# Patient Record
Sex: FEMALE | Race: BLACK OR AFRICAN AMERICAN | NOT HISPANIC OR LATINO | Employment: FULL TIME | URBAN - METROPOLITAN AREA
[De-identification: names, ages, dates, MRNs, and addresses within clinical notes are randomized per-mention and may not be internally consistent; named-entity substitution may affect disease eponyms.]

---

## 2018-09-06 ENCOUNTER — HOSPITAL ENCOUNTER (INPATIENT)
Facility: HOSPITAL | Age: 33
LOS: 2 days | Discharge: HOME | End: 2018-09-08
Attending: OBSTETRICS & GYNECOLOGY | Admitting: OBSTETRICS & GYNECOLOGY
Payer: MEDICAID

## 2018-09-06 DIAGNOSIS — O13.9 PIH (PREGNANCY INDUCED HYPERTENSION): ICD-10-CM

## 2018-09-06 DIAGNOSIS — Z3A.37 37 WEEKS GESTATION OF PREGNANCY: Primary | ICD-10-CM

## 2018-09-06 LAB
ALBUMIN SERPL BCP-MCNC: 2.7 G/DL
ALP SERPL-CCNC: 240 U/L
ALT SERPL W/O P-5'-P-CCNC: 24 U/L
AMPHET+METHAMPHET UR QL: NEGATIVE
ANION GAP SERPL CALC-SCNC: 5 MMOL/L
AST SERPL-CCNC: 32 U/L
BARBITURATES UR QL SCN>200 NG/ML: NEGATIVE
BENZODIAZ UR QL SCN>200 NG/ML: NEGATIVE
BILIRUB SERPL-MCNC: 0.2 MG/DL
BILIRUB UR QL STRIP: NEGATIVE
BUN SERPL-MCNC: 7 MG/DL
BZE UR QL SCN: NEGATIVE
CALCIUM SERPL-MCNC: 8.8 MG/DL
CANNABINOIDS UR QL SCN: NEGATIVE
CHLORIDE SERPL-SCNC: 109 MMOL/L
CLARITY UR: CLEAR
CO2 SERPL-SCNC: 21 MMOL/L
COLOR UR: YELLOW
CREAT SERPL-MCNC: 0.6 MG/DL
CREAT UR-MCNC: 150.7 MG/DL
CREAT UR-MCNC: 150.7 MG/DL
EST. GFR  (AFRICAN AMERICAN): >60 ML/MIN/1.73 M^2
EST. GFR  (NON AFRICAN AMERICAN): >60 ML/MIN/1.73 M^2
GLUCOSE SERPL-MCNC: 90 MG/DL
GLUCOSE UR QL STRIP: NEGATIVE
HGB UR QL STRIP: NEGATIVE
KETONES UR QL STRIP: NEGATIVE
LEUKOCYTE ESTERASE UR QL STRIP: NEGATIVE
NITRITE UR QL STRIP: NEGATIVE
OPIATES UR QL SCN: NEGATIVE
PCP UR QL SCN>25 NG/ML: NEGATIVE
PH UR STRIP: 7 [PH] (ref 5–8)
POTASSIUM SERPL-SCNC: 3.9 MMOL/L
PROT SERPL-MCNC: 6.2 G/DL
PROT UR QL STRIP: ABNORMAL
PROT UR-MCNC: 30 MG/DL
PROT/CREAT UR: 0.2 MG/G{CREAT}
SODIUM SERPL-SCNC: 135 MMOL/L
SP GR UR STRIP: 1.02 (ref 1–1.03)
TOXICOLOGY INFORMATION: NORMAL
URATE SERPL-MCNC: 4.5 MG/DL
URN SPEC COLLECT METH UR: ABNORMAL
UROBILINOGEN UR STRIP-ACNC: 1 EU/DL

## 2018-09-06 PROCEDURE — 3E0P7VZ INTRODUCTION OF HORMONE INTO FEMALE REPRODUCTIVE, VIA NATURAL OR ARTIFICIAL OPENING: ICD-10-PCS | Performed by: OBSTETRICS & GYNECOLOGY

## 2018-09-06 PROCEDURE — 84550 ASSAY OF BLOOD/URIC ACID: CPT

## 2018-09-06 PROCEDURE — 36415 COLL VENOUS BLD VENIPUNCTURE: CPT

## 2018-09-06 PROCEDURE — 10907ZC DRAINAGE OF AMNIOTIC FLUID, THERAPEUTIC FROM PRODUCTS OF CONCEPTION, VIA NATURAL OR ARTIFICIAL OPENING: ICD-10-PCS | Performed by: OBSTETRICS & GYNECOLOGY

## 2018-09-06 PROCEDURE — 3E033VJ INTRODUCTION OF OTHER HORMONE INTO PERIPHERAL VEIN, PERCUTANEOUS APPROACH: ICD-10-PCS | Performed by: OBSTETRICS & GYNECOLOGY

## 2018-09-06 PROCEDURE — 81003 URINALYSIS AUTO W/O SCOPE: CPT

## 2018-09-06 PROCEDURE — 80053 COMPREHEN METABOLIC PANEL: CPT

## 2018-09-06 PROCEDURE — 84156 ASSAY OF PROTEIN URINE: CPT

## 2018-09-06 PROCEDURE — 25000003 PHARM REV CODE 250: Performed by: OBSTETRICS & GYNECOLOGY

## 2018-09-06 PROCEDURE — 11000001 HC ACUTE MED/SURG PRIVATE ROOM

## 2018-09-06 PROCEDURE — 80307 DRUG TEST PRSMV CHEM ANLYZR: CPT

## 2018-09-06 RX ORDER — BUTORPHANOL TARTRATE 2 MG/ML
2 INJECTION INTRAMUSCULAR; INTRAVENOUS EVERY 4 HOURS PRN
Status: DISCONTINUED | OUTPATIENT
Start: 2018-09-06 | End: 2018-09-08 | Stop reason: HOSPADM

## 2018-09-06 RX ORDER — SODIUM CHLORIDE, SODIUM LACTATE, POTASSIUM CHLORIDE, CALCIUM CHLORIDE 600; 310; 30; 20 MG/100ML; MG/100ML; MG/100ML; MG/100ML
INJECTION, SOLUTION INTRAVENOUS CONTINUOUS
Status: DISCONTINUED | OUTPATIENT
Start: 2018-09-06 | End: 2018-09-08 | Stop reason: HOSPADM

## 2018-09-06 RX ORDER — OXYTOCIN/RINGER'S LACTATE 20/1000 ML
2 PLASTIC BAG, INJECTION (ML) INTRAVENOUS CONTINUOUS
Status: DISCONTINUED | OUTPATIENT
Start: 2018-09-07 | End: 2018-09-07

## 2018-09-06 RX ORDER — OXYTOCIN/RINGER'S LACTATE 20/1000 ML
333 PLASTIC BAG, INJECTION (ML) INTRAVENOUS CONTINUOUS
Status: ACTIVE | OUTPATIENT
Start: 2018-09-06 | End: 2018-09-06

## 2018-09-06 RX ADMIN — MISOPROSTOL 50 MCG: 100 TABLET ORAL at 10:09

## 2018-09-06 NOTE — HPI
32-year-old  with an IUP at 376 7th weeks presents for clinic today and was noted to have a blood pressure of 154/100.  Her repeat blood pressure was taken and was noted to be 155/107.  The patient denies any headaches.  She reports no abdominal pain or visual disturbances.  Her DTRs are 2/2.  Her dates are consistent with a 9 week ultrasound with a due date of 2018.  Blood pressures on labor and delivery remained elevated despite bed rest.  She was noted to have trace protein on her urinalysis.  PIH labs were in normal with a creatinine of 0.6.  Prenatal course is significant for a history of Chlamydia with a negative test of cure history of herpes on prophylaxis and no lesions visualized.    She is GBS negative.  A repeat HIV and RPR in the 3rd trimester were negative.

## 2018-09-06 NOTE — H&P
[facility] - Labor and Delivery  Obstetrics  History & Physical    Patient Name: Linda Hart  MRN: [id number]  Admission Date: 2018  Primary Care Provider: Primary Doctor No    Subjective:     Principal Problem:PIH (pregnancy induced hypertension)    History of Present Illness:  32-year-old  with an IUP at 376 7th weeks presents for clinic today and was noted to have a blood pressure of 154/100.  Her repeat blood pressure was taken and was noted to be 155/107.  The patient denies any headaches.  She reports no abdominal pain or visual disturbances.  Her DTRs are 2/2.  Her dates are consistent with a 9 week ultrasound with a due date of 2018.  Blood pressures on labor and delivery remained elevated despite bed rest.  She was noted to have trace protein on her urinalysis.  PIH labs were in normal with a creatinine of 0.6.  Prenatal course is significant for a history of Chlamydia with a negative test of cure history of herpes on prophylaxis and no lesions visualized.    She is GBS negative.  A repeat HIV and RPR in the 3rd trimester were negative.    Obstetric HPI:  Patient reports   Irregular contractions, active fetal movement, No vaginal bleeding , No loss of fluid     This pregnancy has been complicated by  Chlamydia with negative test of cure, herpes on prophylaxis with no lesions and today gestational hypertension    Obstetric History       T1      L1     SAB1   TAB0   Ectopic0   Multiple0   Live Births1       # Outcome Date GA Lbr Maycol/2nd Weight Sex Delivery Anes PTL Lv   3 Current            2 SAB            1 Term                 Past Medical History:   Diagnosis Date    Anemia     Herpes simplex virus (HSV) infection     Hypertension      Past Surgical History:   Procedure Laterality Date    DILATION AND CURETTAGE OF UTERUS         PTA Medications   Medication Sig    prenat.vits,alexa,min-iron-folic Tab Take by mouth once daily.       Review of patient's  allergies indicates:  No Known Allergies     Family History     Problem Relation (Age of Onset)    Hyperlipidemia Sister        Tobacco Use    Smoking status: Never Smoker   Substance and Sexual Activity    Alcohol use: No     Frequency: Never    Drug use: No    Sexual activity: Yes     Partners: Male     Birth control/protection: None     Review of Systems   Constitutional: Negative.    HENT: Negative.    Eyes: Negative.  Negative for visual disturbance.   Respiratory: Negative.    Cardiovascular: Negative.    Gastrointestinal: Negative.  Negative for abdominal pain.   Endocrine: Negative.    Genitourinary: Negative.    Musculoskeletal: Negative.    Skin:  Negative.   Neurological: Negative for headaches.   Hematological: Negative.    Psychiatric/Behavioral: Negative.  Negative for sleep disturbance.   Breast: negative.       Objective:     Vital Signs (Most Recent):    Vital Signs (24h Range):           There is no height or weight on file to calculate BMI.    FHT: 130sCat 1 (reassuring)  TOCO:  Q 6 minutes    Physical Exam:   Constitutional: She is oriented to person, place, and time. She appears well-developed and well-nourished.    HENT:   Head: Normocephalic and atraumatic.    Eyes: Conjunctivae and EOM are normal. Pupils are equal, round, and reactive to light.    Neck: Normal range of motion. Neck supple.    Cardiovascular: Normal rate, regular rhythm, normal heart sounds and intact distal pulses.     Pulmonary/Chest: Effort normal and breath sounds normal.        Abdominal: Soft. Bowel sounds are normal.             Musculoskeletal: Normal range of motion and moves all extremeties.       Neurological: She is alert and oriented to person, place, and time. She has normal reflexes.    Skin: Skin is warm and dry.    Psychiatric: She has a normal mood and affect. Her behavior is normal. Judgment and thought content normal.       Cervix:  Dilation:  2  Effacement:  50%  Station: -2  Presentation: Vertex      Significant Labs:  No results found for: GROUPTRH, HEPBSAG, RUBELLAIGGSC, STREPBCULT, AFP, SYDQPUU0SK    BMP:   Recent Labs   Lab  18   1459   GLU  90   NA  135*   K  3.9   CL  109   CO2  21*   BUN  7   CREATININE  0.6   CALCIUM  8.8     CBC: No results for input(s): WBC, RBC, HGB, HCT, PLT, MCV, MCH, MCHC in the last 48 hours.  Recent Labs   Lab  18   1405   COLORU  Yellow   SPECGRAV  1.020   PHUR  7.0   PROTEINUA  Trace*   NITRITE  Negative   LEUKOCYTESUR  Negative   UROBILINOGEN  1.0     I have personallly reviewed all pertinent lab results from the last 24 hours.    Assessment/Plan:     32 y.o. female  at 37w6d for:    * PIH (pregnancy induced hypertension)     Will proceed with Cytotec Pitocin induction            Francisco Chery MD  Obstetrics  [facility] - Labor and Delivery

## 2018-09-06 NOTE — SUBJECTIVE & OBJECTIVE
Obstetric HPI:  Patient reports   Irregular contractions, active fetal movement, No vaginal bleeding , No loss of fluid     This pregnancy has been complicated by  Chlamydia with negative test of cure, herpes on prophylaxis with no lesions and today gestational hypertension    Obstetric History       T1      L1     SAB1   TAB0   Ectopic0   Multiple0   Live Births1       # Outcome Date GA Lbr Maycol/2nd Weight Sex Delivery Anes PTL Lv   3 Current            2 SAB            1 Term                 Past Medical History:   Diagnosis Date    Anemia     Herpes simplex virus (HSV) infection     Hypertension      Past Surgical History:   Procedure Laterality Date    DILATION AND CURETTAGE OF UTERUS         PTA Medications   Medication Sig    prenat.vits,alexa,min-iron-folic Tab Take by mouth once daily.       Review of patient's allergies indicates:  No Known Allergies     Family History     Problem Relation (Age of Onset)    Hyperlipidemia Sister        Tobacco Use    Smoking status: Never Smoker   Substance and Sexual Activity    Alcohol use: No     Frequency: Never    Drug use: No    Sexual activity: Yes     Partners: Male     Birth control/protection: None     Review of Systems   Constitutional: Negative.    HENT: Negative.    Eyes: Negative.  Negative for visual disturbance.   Respiratory: Negative.    Cardiovascular: Negative.    Gastrointestinal: Negative.  Negative for abdominal pain.   Endocrine: Negative.    Genitourinary: Negative.    Musculoskeletal: Negative.    Skin:  Negative.   Neurological: Negative for headaches.   Hematological: Negative.    Psychiatric/Behavioral: Negative.  Negative for sleep disturbance.   Breast: negative.       Objective:     Vital Signs (Most Recent):    Vital Signs (24h Range):           There is no height or weight on file to calculate BMI.    FHT: 130sCat 1 (reassuring)  TOCO:  Q 6 minutes    Physical Exam:   Constitutional: She is oriented to person, place, and  time. She appears well-developed and well-nourished.    HENT:   Head: Normocephalic and atraumatic.    Eyes: Conjunctivae and EOM are normal. Pupils are equal, round, and reactive to light.    Neck: Normal range of motion. Neck supple.    Cardiovascular: Normal rate, regular rhythm, normal heart sounds and intact distal pulses.     Pulmonary/Chest: Effort normal and breath sounds normal.        Abdominal: Soft. Bowel sounds are normal.             Musculoskeletal: Normal range of motion and moves all extremeties.       Neurological: She is alert and oriented to person, place, and time. She has normal reflexes.    Skin: Skin is warm and dry.    Psychiatric: She has a normal mood and affect. Her behavior is normal. Judgment and thought content normal.       Cervix:  Dilation:  2  Effacement:  50%  Station: -2  Presentation: Vertex     Significant Labs:  No results found for: GROUPTRH, HEPBSAG, RUBELLAIGGSC, STREPBCULT, AFP, RUIRGUI0PB    BMP:   Recent Labs   Lab  09/06/18   1459   GLU  90   NA  135*   K  3.9   CL  109   CO2  21*   BUN  7   CREATININE  0.6   CALCIUM  8.8     CBC: No results for input(s): WBC, RBC, HGB, HCT, PLT, MCV, MCH, MCHC in the last 48 hours.  Recent Labs   Lab  09/06/18   1405   COLORU  Yellow   SPECGRAV  1.020   PHUR  7.0   PROTEINUA  Trace*   NITRITE  Negative   LEUKOCYTESUR  Negative   UROBILINOGEN  1.0     I have personallly reviewed all pertinent lab results from the last 24 hours.

## 2018-09-07 LAB
ABO + RH BLD: NORMAL
BASOPHILS # BLD AUTO: 0.03 K/UL
BASOPHILS NFR BLD: 0.2 %
BLD GP AB SCN CELLS X3 SERPL QL: NORMAL
DIFFERENTIAL METHOD: ABNORMAL
EOSINOPHIL # BLD AUTO: 0 K/UL
EOSINOPHIL NFR BLD: 0.3 %
ERYTHROCYTE [DISTWIDTH] IN BLOOD BY AUTOMATED COUNT: 14.6 %
HCT VFR BLD AUTO: 34.6 %
HGB BLD-MCNC: 11.3 G/DL
IMM GRANULOCYTES # BLD AUTO: 0.12 K/UL
IMM GRANULOCYTES NFR BLD AUTO: 0.9 %
LYMPHOCYTES # BLD AUTO: 1.5 K/UL
LYMPHOCYTES NFR BLD: 11.1 %
MCH RBC QN AUTO: 28 PG
MCHC RBC AUTO-ENTMCNC: 32.7 G/DL
MCV RBC AUTO: 86 FL
MONOCYTES # BLD AUTO: 1 K/UL
MONOCYTES NFR BLD: 7.7 %
NEUTROPHILS # BLD AUTO: 10.5 K/UL
NEUTROPHILS NFR BLD: 79.8 %
NRBC BLD-RTO: 0 /100 WBC
PLATELET # BLD AUTO: 169 K/UL
PMV BLD AUTO: 12.6 FL
RBC # BLD AUTO: 4.03 M/UL
WBC # BLD AUTO: 13.2 K/UL

## 2018-09-07 PROCEDURE — 85025 COMPLETE CBC W/AUTO DIFF WBC: CPT

## 2018-09-07 PROCEDURE — 25000003 PHARM REV CODE 250: Performed by: OBSTETRICS & GYNECOLOGY

## 2018-09-07 PROCEDURE — 86850 RBC ANTIBODY SCREEN: CPT

## 2018-09-07 PROCEDURE — 72100002 HC LABOR CARE, 1ST 8 HOURS

## 2018-09-07 PROCEDURE — 63600175 PHARM REV CODE 636 W HCPCS: Performed by: OBSTETRICS & GYNECOLOGY

## 2018-09-07 PROCEDURE — 11000001 HC ACUTE MED/SURG PRIVATE ROOM

## 2018-09-07 PROCEDURE — 36415 COLL VENOUS BLD VENIPUNCTURE: CPT

## 2018-09-07 PROCEDURE — 72200005 HC VAGINAL DELIVERY LEVEL II

## 2018-09-07 RX ORDER — ACETAMINOPHEN 325 MG/1
650 TABLET ORAL EVERY 6 HOURS PRN
Status: DISCONTINUED | OUTPATIENT
Start: 2018-09-07 | End: 2018-09-08 | Stop reason: HOSPADM

## 2018-09-07 RX ORDER — DOCUSATE SODIUM 100 MG/1
200 CAPSULE, LIQUID FILLED ORAL 2 TIMES DAILY PRN
Status: DISCONTINUED | OUTPATIENT
Start: 2018-09-07 | End: 2018-09-08 | Stop reason: HOSPADM

## 2018-09-07 RX ORDER — HYDROCODONE BITARTRATE AND ACETAMINOPHEN 7.5; 325 MG/1; MG/1
1 TABLET ORAL EVERY 4 HOURS PRN
Status: DISCONTINUED | OUTPATIENT
Start: 2018-09-07 | End: 2018-09-08 | Stop reason: HOSPADM

## 2018-09-07 RX ORDER — SODIUM CHLORIDE 0.9 % (FLUSH) 0.9 %
3 SYRINGE (ML) INJECTION EVERY 8 HOURS
Status: CANCELLED | OUTPATIENT
Start: 2018-09-07

## 2018-09-07 RX ORDER — ONDANSETRON 4 MG/1
8 TABLET, ORALLY DISINTEGRATING ORAL EVERY 8 HOURS PRN
Status: DISCONTINUED | OUTPATIENT
Start: 2018-09-07 | End: 2018-09-08 | Stop reason: HOSPADM

## 2018-09-07 RX ORDER — HYDROCODONE BITARTRATE AND ACETAMINOPHEN 10; 325 MG/1; MG/1
1 TABLET ORAL EVERY 4 HOURS PRN
Status: DISCONTINUED | OUTPATIENT
Start: 2018-09-07 | End: 2018-09-08 | Stop reason: HOSPADM

## 2018-09-07 RX ORDER — DIPHENHYDRAMINE HYDROCHLORIDE 50 MG/ML
25 INJECTION INTRAMUSCULAR; INTRAVENOUS EVERY 4 HOURS PRN
Status: DISCONTINUED | OUTPATIENT
Start: 2018-09-07 | End: 2018-09-08 | Stop reason: HOSPADM

## 2018-09-07 RX ORDER — HYDROCORTISONE 25 MG/G
CREAM TOPICAL 3 TIMES DAILY PRN
Status: DISCONTINUED | OUTPATIENT
Start: 2018-09-07 | End: 2018-09-08 | Stop reason: HOSPADM

## 2018-09-07 RX ORDER — OXYTOCIN/RINGER'S LACTATE 20/1000 ML
41.65 PLASTIC BAG, INJECTION (ML) INTRAVENOUS CONTINUOUS
Status: CANCELLED | OUTPATIENT
Start: 2018-09-07 | End: 2018-09-07

## 2018-09-07 RX ORDER — DIPHENHYDRAMINE HCL 25 MG
25 CAPSULE ORAL EVERY 4 HOURS PRN
Status: DISCONTINUED | OUTPATIENT
Start: 2018-09-07 | End: 2018-09-08 | Stop reason: HOSPADM

## 2018-09-07 RX ADMIN — SODIUM CHLORIDE, POTASSIUM CHLORIDE, SODIUM LACTATE AND CALCIUM CHLORIDE: 600; 310; 30; 20 INJECTION, SOLUTION INTRAVENOUS at 02:09

## 2018-09-07 RX ADMIN — HYDROCODONE BITARTRATE AND ACETAMINOPHEN 1 TABLET: 7.5; 325 TABLET ORAL at 10:09

## 2018-09-07 RX ADMIN — Medication 2 MILLI-UNITS/MIN: at 02:09

## 2018-09-07 RX ADMIN — BUTORPHANOL TARTRATE 2 MG: 2 INJECTION, SOLUTION INTRAMUSCULAR; INTRAVENOUS at 12:09

## 2018-09-07 RX ADMIN — PROMETHAZINE HYDROCHLORIDE 25 MG: 25 INJECTION INTRAMUSCULAR; INTRAVENOUS at 12:09

## 2018-09-07 RX ADMIN — HYDROCODONE BITARTRATE AND ACETAMINOPHEN 1 TABLET: 7.5; 325 TABLET ORAL at 05:09

## 2018-09-07 NOTE — NURSING
0645:SBAR report received from DANA Chambers RN. Upon entering room pt lying in bed resting. Pt denies any needs or discomfort at this time. Bleeding noted to be moderate and fundus firm and midline. Will continue to monitor and assess.

## 2018-09-07 NOTE — HOSPITAL COURSE
32-year-old  with an IUP at 376 7th weeks presents for clinic today and was noted to have a blood pressure of 154/100.  Her repeat blood pressure was taken and was noted to be 155/107.  The patient denies any headaches.  She reports no abdominal pain or visual disturbances.  Her DTRs are 2/2.  Her dates are consistent with a 9 week ultrasound with a due date of 2018.  Blood pressures on labor and delivery remained elevated despite bed rest.  She was noted to have trace protein on her urinalysis.  PIH labs were in normal with a creatinine of 0.6.  Prenatal course is significant for a history of Chlamydia with a negative test of cure history of herpes on prophylaxis and no lesions visualized.    She is GBS negative.  A repeat HIV and RPR in the 3rd trimester were negative.\    L/d note bp 130/80, dtr 2/2.  cx 8cm , 80% on 20 mu pit.  fht stable no decels.  arom clear.  hct 34/  33  ua clear.  cytotec /pitocin /arom   dictation conf number [id number]  Female apgars 9/10 7#2oz

## 2018-09-07 NOTE — PROGRESS NOTES
[facility] - Post Partum  Obstetrics  Antepartum Progress Note    Patient Name: Linda Hart  MRN: [id number]  Admission Date: 2018  Hospital Length of Stay: 2 days  Attending Physician: Francois Frias MD  Primary Care Provider: Primary Doctor No    Subjective:     Principal Problem:PIH (pregnancy induced hypertension)    HPI:  32-year-old  with an IUP at 376 7th weeks presents for clinic today and was noted to have a blood pressure of 154/100.  Her repeat blood pressure was taken and was noted to be 155/107.  The patient denies any headaches.  She reports no abdominal pain or visual disturbances.  Her DTRs are 2/2.  Her dates are consistent with a 9 week ultrasound with a due date of 2018.  Blood pressures on labor and delivery remained elevated despite bed rest.  She was noted to have trace protein on her urinalysis.  PIH labs were in normal with a creatinine of 0.6.  Prenatal course is significant for a history of Chlamydia with a negative test of cure history of herpes on prophylaxis and no lesions visualized.    She is GBS negative.  A repeat HIV and RPR in the 3rd trimester were negative.    Hospital Course:  32-year-old  with an IUP at 376 7th weeks presents for clinic today and was noted to have a blood pressure of 154/100.  Her repeat blood pressure was taken and was noted to be 155/107.  The patient denies any headaches.  She reports no abdominal pain or visual disturbances.  Her DTRs are 2/2.  Her dates are consistent with a 9 week ultrasound with a due date of 2018.  Blood pressures on labor and delivery remained elevated despite bed rest.  She was noted to have trace protein on her urinalysis.  PIH labs were in normal with a creatinine of 0.6.  Prenatal course is significant for a history of Chlamydia with a negative test of cure history of herpes on prophylaxis and no lesions visualized.    She is GBS negative.  A repeat HIV and RPR in the 3rd trimester were  "negative.\    L/d note bp 130/80, dtr 2/2.  cx 8cm , 80% on 20 mu pit.  fht stable no decels.  arom clear.  hct 34/  33  ua clear.  cytotec /pitocin /arom   dictation conf number [id number]  Female apgars 9/10 7#2oz      Assessment/Plan:     32 y.o. female  at 38w1d for:dc today    * PIH (pregnancy induced hypertension)     Will proceed with Cytotec Pitocin induction              Francois Frias MD  Obstetrics  [facility] - Post Partum          PPD#1 S/P     Subjective: No complaints    Objective: BP (!) 155/88 (BP Location: Left arm, Patient Position: Sitting)   Pulse 91   Temp 98 °F (36.7 °C) (Oral)   Resp 16   Ht 5' 6.5" (1.689 m)   Wt 62.6 kg (138 lb)   SpO2 97%   Breastfeeding? Yes   BMI 21.94 kg/m²     H/H:   Lab Results   Component Value Date    WBC 13.79 (H) 2018    HGB 10.9 (L) 2018    HCT 33.0 (L) 2018    MCV 85 2018     2018       Fundus: Firm, non- tender  Lochia: Moderate  Extremities: Non-tender, no edema    Assessment: PPD #1 S/P     Plan: Routine progressive care    "

## 2018-09-07 NOTE — L&D DELIVERY NOTE
DATE OF PROCEDURE:  2018.    DIAGNOSIS:  Uterine pregnancy at 37 and half weeks, delivered viable female  infant, Apgars 9 and 10.  Cytotec, Pitocin induction, artificial rupture of  membranes, spontaneous vaginal delivery and gestational hypertension.    PERTINENT HISTORY:  The patient is a 32-year-old  2, para 1 at 37  and 6/7 weeks who presented to clinic with a blood pressure of 154/100,  repeat blood pressure was 155/107.  She denied headaches, visual  disturbances or abdominal pain.  DTRs were 2/2.  She was noted to have  trace protein on urinalysis, PIH, labs were within normal limits, GBS  negative.  HIV, RPR negative, hepatitis B negative.  She was given Cytotec  followed by Pitocin.  Maximum Pitocin was 20 milliunits.  Blood pressures  were in the 140-160 range over  with a 2/2 DTRs and she had  artificial rupture of membranes at 8 cm, which revealed clear fluid.   Maximum Pitocin was 20 milliunits.  Fetal heart tones were stable.   Artificial rupture of membranes revealed clear fluid.  She dilated to  complete, pushed effectively, delivered under intact perineum, direct OA,  suctioned thoroughly on the perineum.  Shoulders delivered without  difficulty.  Cord was clamped and cut and the infant placed on the maternal  abdomen with nurses in attendance.  Blood obtained from umbilical cord for  Rh status and CBC.  Placenta delivered, Castro intact.  TRACY cord.  No  lacerations were noted.  The patient tolerated the labor and delivery well.        TRINI dd: 2018 06:10:36 (CDT)   td: 2018 07:43:51 (CDT)  Doc ID #[id number]   Job ID #[id number]    CC:

## 2018-09-08 VITALS
RESPIRATION RATE: 18 BRPM | HEIGHT: 67 IN | OXYGEN SATURATION: 96 % | SYSTOLIC BLOOD PRESSURE: 128 MMHG | TEMPERATURE: 98 F | WEIGHT: 138 LBS | BODY MASS INDEX: 21.66 KG/M2 | HEART RATE: 81 BPM | DIASTOLIC BLOOD PRESSURE: 88 MMHG

## 2018-09-08 LAB
BASOPHILS # BLD AUTO: 0.05 K/UL
BASOPHILS NFR BLD: 0.4 %
DIFFERENTIAL METHOD: ABNORMAL
EOSINOPHIL # BLD AUTO: 0.1 K/UL
EOSINOPHIL NFR BLD: 0.6 %
ERYTHROCYTE [DISTWIDTH] IN BLOOD BY AUTOMATED COUNT: 14.4 %
HCT VFR BLD AUTO: 33 %
HGB BLD-MCNC: 10.9 G/DL
IMM GRANULOCYTES # BLD AUTO: 0.12 K/UL
IMM GRANULOCYTES NFR BLD AUTO: 0.9 %
LYMPHOCYTES # BLD AUTO: 2.1 K/UL
LYMPHOCYTES NFR BLD: 14.9 %
MCH RBC QN AUTO: 28.2 PG
MCHC RBC AUTO-ENTMCNC: 33 G/DL
MCV RBC AUTO: 85 FL
MONOCYTES # BLD AUTO: 1.1 K/UL
MONOCYTES NFR BLD: 7.8 %
NEUTROPHILS # BLD AUTO: 10.4 K/UL
NEUTROPHILS NFR BLD: 75.4 %
NRBC BLD-RTO: 0 /100 WBC
PLATELET # BLD AUTO: 181 K/UL
PMV BLD AUTO: 12.8 FL
RBC # BLD AUTO: 3.87 M/UL
WBC # BLD AUTO: 13.79 K/UL

## 2018-09-08 PROCEDURE — 63600175 PHARM REV CODE 636 W HCPCS: Performed by: OBSTETRICS & GYNECOLOGY

## 2018-09-08 PROCEDURE — 90471 IMMUNIZATION ADMIN: CPT | Performed by: OBSTETRICS & GYNECOLOGY

## 2018-09-08 PROCEDURE — 85025 COMPLETE CBC W/AUTO DIFF WBC: CPT

## 2018-09-08 PROCEDURE — 36415 COLL VENOUS BLD VENIPUNCTURE: CPT

## 2018-09-08 PROCEDURE — 90715 TDAP VACCINE 7 YRS/> IM: CPT | Performed by: OBSTETRICS & GYNECOLOGY

## 2018-09-08 RX ORDER — HYDROCODONE BITARTRATE AND ACETAMINOPHEN 7.5; 325 MG/1; MG/1
1 TABLET ORAL EVERY 4 HOURS PRN
Qty: 20 TABLET | Refills: 0 | Status: SHIPPED | OUTPATIENT
Start: 2018-09-08 | End: 2018-11-15

## 2018-09-08 RX ORDER — DOCUSATE SODIUM 100 MG/1
200 CAPSULE, LIQUID FILLED ORAL 2 TIMES DAILY PRN
Refills: 0 | COMMUNITY
Start: 2018-09-08 | End: 2018-11-15

## 2018-09-08 RX ADMIN — CLOSTRIDIUM TETANI TOXOID ANTIGEN (FORMALDEHYDE INACTIVATED), CORYNEBACTERIUM DIPHTHERIAE TOXOID ANTIGEN (FORMALDEHYDE INACTIVATED), BORDETELLA PERTUSSIS TOXOID ANTIGEN (GLUTARALDEHYDE INACTIVATED), BORDETELLA PERTUSSIS FILAMENTOUS HEMAGGLUTININ ANTIGEN (FORMALDEHYDE INACTIVATED), BORDETELLA PERTUSSIS PERTACTIN ANTIGEN, AND BORDETELLA PERTUSSIS FIMBRIAE 2/3 ANTIGEN 0.5 ML: 5; 2; 2.5; 5; 3; 5 INJECTION, SUSPENSION INTRAMUSCULAR at 12:09

## 2018-09-08 NOTE — NURSING
Pt presented with AVS and d/c  Instructions. Pt given prescriptions. Pt verbalizes understanding of all d/c instructions and states no further questions at this time. Pt aware to call and make follow-up appointment with Dr. Frias on Monday 9/10/18.

## 2018-09-08 NOTE — DISCHARGE SUMMARY
[facility] - Post Partum  Obstetrics  Discharge Summary      Patient Name: Linda Hart  MRN: [id number]  Admission Date: 2018  Hospital Length of Stay: 2 days  Discharge Date and Time:  2018 7:15 AM  Attending Physician: Francois Frias MD   Discharging Provider: Francois Frias MD  Primary Care Provider: Primary Doctor No    HPI:     * No surgery found *     Hospital Course:      32-year-old  with an IUP at 376 7th weeks presents for clinic today and was noted to have a blood pressure of 154/100.  Her repeat blood pressure was taken and was noted to be 155/107.  The patient denies any headaches.  She reports no abdominal pain or visual disturbances.  Her DTRs are 2/2.  Her dates are consistent with a 9 week ultrasound with a due date of 2018.  Blood pressures on labor and delivery remained elevated despite bed rest.  She was noted to have trace protein on her urinalysis.  PIH labs were in normal with a creatinine of 0.6.  Prenatal course is significant for a history of Chlamydia with a negative test of cure history of herpes on prophylaxis and no lesions visualized.    She is GBS negative.  A repeat HIV and RPR in the 3rd trimester were negative.\    L/d note bp 130/80, dtr 2/2.  cx 8cm , 80% on 20 mu pit.  fht stable no decels.  arom clear.  hct 34/  33  ua clear.  cytotec /pitocin /arom   dictation conf number [id number]  Female apgars 9/10 7#2oz    Final Active Diagnoses:    Diagnosis Date Noted POA    PRINCIPAL PROBLEM:  PIH (pregnancy induced hypertension) [O13.9] 2018 Yes    37 weeks gestation of pregnancy [Z3A.37] 2018 Not Applicable      Problems Resolved During this Admission:        Labs: All labs within the past 24 hours have been reviewed    Feeding Method: breast    Immunizations     Date Immunization Status Dose Route/Site Given by    18 1036 Tdap Incomplete 0.5 mL Intramuscular/Left deltoid           Delivery:    Episiotomy: None    Lacerations: None   Repair suture: None   Repair # of packets:     Blood loss (ml):       Birth information:  YOB: 2016   Time of birth: 5:43 AM   Sex: female   Delivery type: Vaginal, Spontaneous Delivery   Gestational Age: 38w0d    Delivery Clinician:      Other providers:       Additional  information:  Forceps:    Vacuum:    Breech:    Observed anomalies Admitting this is a 32-year-old  2 para 1 came with labor pains gestational age of 38 weeks time and with a spontaneous vaginal delivery and uneventful baby was latching onto the breast after that.     Living?:           APGARS  One minute Five minutes Ten minutes   Skin color:         Heart rate:         Grimace:         Muscle tone:         Breathing:         Totals: 9  10        Placenta: Delivered:       appearance    Pending Diagnostic Studies:     None          Discharged Condition: good    Disposition: Home or Self Care    Follow Up:  Follow-up Information     Francois Frias MD In 2 weeks.    Specialty:  Obstetrics and Gynecology  Contact information:  [address] [facility] [address]  [phone]                 Patient Instructions:      Diet Adult Regular     Lifting restrictions   Order Comments: Lift less than 20 lbs     Other restrictions (specify):   Order Comments: No sex x 6 wks     Notify your health care provider if you experience any of the following:  temperature >100.4     Notify your health care provider if you experience any of the following:  severe uncontrolled pain     Notify your health care provider if you experience any of the following:  redness, tenderness, or signs of infection (pain, swelling, redness, odor or green/yellow discharge around incision site)     Notify your health care provider if you experience any of the following:  difficulty breathing or increased cough     Notify your health care provider if you experience any of the following:  severe persistent headache      Notify your health care provider if you experience any of the following:   Order Comments: Increased pain     Change dressing (specify)   Order Comments: If aquasel dressing, do not remove dressing. And you may shower with it on.  It will be removed in the office at one week.     Medications:  Current Discharge Medication List      START taking these medications    Details   docusate sodium (COLACE) 100 MG capsule Take 2 capsules (200 mg total) by mouth 2 (two) times daily as needed for Constipation.  Refills: 0      HYDROcodone-acetaminophen (NORCO) 7.5-325 mg per tablet Take 1 tablet by mouth every 4 (four) hours as needed.  Qty: 20 tablet, Refills: 0         CONTINUE these medications which have NOT CHANGED    Details   prenat.vits,alexa,min-iron-folic Tab Take by mouth once daily.             Francois Frias MD  Obstetrics  [facility] - Post Partum

## 2018-09-08 NOTE — NURSING
Pt ambulated to car for d/c without difficulty. Pt has all personal belongings with her. Pt denies any further needs, pt d/c home as per MD order.

## 2018-09-08 NOTE — DISCHARGE SUMMARY
[facility]  Obstetrics  Discharge Summary      Patient Name: Linda Hart  MRN: [id number]  Admission Date: 2018  Hospital Length of Stay: 2 days  Discharge Date and Time:  2018 7:13 AM  Attending Physician: Francois Frias MD   Discharging Provider: Francois Frias MD  Primary Care Provider: Primary Doctor No    HPI:    * No surgery found *     Hospital Course: 32-year-old  with an IUP at 376 7th weeks presents for clinic today and was noted to have a blood pressure of 154/100.  Her repeat blood pressure was taken and was noted to be 155/107.  The patient denies any headaches.  She reports no abdominal pain or visual disturbances.  Her DTRs are 2/2.  Her dates are consistent with a 9 week ultrasound with a due date of 2018.  Blood pressures on labor and delivery remained elevated despite bed rest.  She was noted to have trace protein on her urinalysis.  PIH labs were in normal with a creatinine of 0.6.  Prenatal course is significant for a history of Chlamydia with a negative test of cure history of herpes on prophylaxis and no lesions visualized.    She is GBS negative.  A repeat HIV and RPR in the 3rd trimester were negative.\    L/d note bp 130/80, dtr 2/2.  cx 8cm , 80% on 20 mu pit.  fht stable no decels.  arom clear.  hct 34/  33  ua clear.  cytotec /pitocin /arom   dictation conf number [id number]  Female apgars 9/10 7#2oz        Final Active Diagnoses:    Diagnosis Date Noted POA    PRINCIPAL PROBLEM:  PIH (pregnancy induced hypertension) [O13.9] 2018 Yes    37 weeks gestation of pregnancy [Z3A.37] 2018 Not Applicable      Problems Resolved During this Admission:        Labs: All labs within the past 24 hours have been reviewed    Feeding Method: breast    Immunizations     Date Immunization Status Dose Route/Site Given by    18 1036 Tdap Incomplete 0.5 mL Intramuscular/Left deltoid           Delivery:    Episiotomy: None   Lacerations:  None   Repair suture: None   Repair # of packets:     Blood loss (ml):       Birth information:  YOB: 2016   Time of birth: 5:43 AM   Sex: female   Delivery type: Vaginal, Spontaneous Delivery   Gestational Age: 38w0d    Delivery Clinician:      Other providers:       Additional  information:  Forceps:    Vacuum:    Breech:    Observed anomalies Admitting this is a 32-year-old  2 para 1 came with labor pains gestational age of 38 weeks time and with a spontaneous vaginal delivery and uneventful baby was latching onto the breast after that.     Living?:           APGARS  One minute Five minutes Ten minutes   Skin color:         Heart rate:         Grimace:         Muscle tone:         Breathing:         Totals: 9  10        Placenta: Delivered:       appearance    Pending Diagnostic Studies:     None          Discharged Condition: good    Disposition: Home or Self Care    Follow Up:  Follow-up Information     Francois Frias MD In 2 weeks.    Specialty:  Obstetrics and Gynecology  Contact information:  [address] [facility] [address]  [phone]                 Patient Instructions:      Diet Adult Regular     Lifting restrictions   Order Comments: Lift less than 20 lbs     Other restrictions (specify):   Order Comments: No sex x 6 wks     Notify your health care provider if you experience any of the following:  temperature >100.4     Notify your health care provider if you experience any of the following:  severe uncontrolled pain     Notify your health care provider if you experience any of the following:  redness, tenderness, or signs of infection (pain, swelling, redness, odor or green/yellow discharge around incision site)     Notify your health care provider if you experience any of the following:  difficulty breathing or increased cough     Notify your health care provider if you experience any of the following:  severe persistent headache     Notify your  health care provider if you experience any of the following:   Order Comments: Increased pain     Change dressing (specify)   Order Comments: If aquasel dressing, do not remove dressing. And you may shower with it on.  It will be removed in the office at one week.     Medications:  Current Discharge Medication List      START taking these medications    Details   docusate sodium (COLACE) 100 MG capsule Take 2 capsules (200 mg total) by mouth 2 (two) times daily as needed for Constipation.  Refills: 0      HYDROcodone-acetaminophen (NORCO) 7.5-325 mg per tablet Take 1 tablet by mouth every 4 (four) hours as needed.  Qty: 20 tablet, Refills: 0         CONTINUE these medications which have NOT CHANGED    Details   prenat.vits,alexa,min-iron-folic Tab Take by mouth once daily.             Francois Frias MD  Obstetrics  [facility]

## 2018-09-08 NOTE — DISCHARGE SUMMARY
[facility] Partum  Obstetrics  Discharge Summary      Patient Name: Linda Hart  MRN: [id number]  Admission Date: 2018  Hospital Length of Stay: 2 days  Discharge Date and Time:  2018 7:16 AM  Attending Physician: Francois Frias MD   Discharging Provider: Francois Frias MD  Primary Care Provider: Primary Doctor No    HPI:     * No surgery found *     Hospital Course:  32-year-old  with an IUP at 376 7th weeks presents for clinic today and was noted to have a blood pressure of 154/100.  Her repeat blood pressure was taken and was noted to be 155/107.  The patient denies any headaches.  She reports no abdominal pain or visual disturbances.  Her DTRs are 2/2.  Her dates are consistent with a 9 week ultrasound with a due date of 2018.  Blood pressures on labor and delivery remained elevated despite bed rest.  She was noted to have trace protein on her urinalysis.  PIH labs were in normal with a creatinine of 0.6.  Prenatal course is significant for a history of Chlamydia with a negative test of cure history of herpes on prophylaxis and no lesions visualized.    She is GBS negative.  A repeat HIV and RPR in the 3rd trimester were negative.\    L/d note bp 130/80, dtr 2/2.  cx 8cm , 80% on 20 mu pit.  fht stable no decels.  arom clear.  hct 34/  33  ua clear.  cytotec /pitocin /arom   dictation conf number [id number]  Female apgars 9/10 7#2oz      Final Active Diagnoses:    Diagnosis Date Noted POA    PRINCIPAL PROBLEM:  PIH (pregnancy induced hypertension) [O13.9] 2018 Yes    37 weeks gestation of pregnancy [Z3A.37] 2018 Not Applicable      Problems Resolved During this Admission:        Labs: All labs within the past 24 hours have been reviewed    Feeding Method: breast    Immunizations     Date Immunization Status Dose Route/Site Given by    18 1036 Tdap Incomplete 0.5 mL Intramuscular/Left deltoid           Delivery:    Episiotomy: None   Lacerations:  None   Repair suture: None   Repair # of packets:     Blood loss (ml):       Birth information:  YOB: 2016   Time of birth: 5:43 AM   Sex: female   Delivery type: Vaginal, Spontaneous Delivery   Gestational Age: 38w0d    Delivery Clinician:      Other providers:       Additional  information:  Forceps:    Vacuum:    Breech:    Observed anomalies Admitting this is a 32-year-old  2 para 1 came with labor pains gestational age of 38 weeks time and with a spontaneous vaginal delivery and uneventful baby was latching onto the breast after that.     Living?:           APGARS  One minute Five minutes Ten minutes   Skin color:         Heart rate:         Grimace:         Muscle tone:         Breathing:         Totals: 9  10        Placenta: Delivered:       appearance    Pending Diagnostic Studies:     None          Discharged Condition: good    Disposition: Home or Self Care    Follow Up:  Follow-up Information     Francois Frias MD In 2 weeks.    Specialty:  Obstetrics and Gynecology  Contact information:  [address] [facility] [address]  [phone]                 Patient Instructions:      Diet Adult Regular     Lifting restrictions   Order Comments: Lift less than 20 lbs     Other restrictions (specify):   Order Comments: No sex x 6 wks     Notify your health care provider if you experience any of the following:  temperature >100.4     Notify your health care provider if you experience any of the following:  severe uncontrolled pain     Notify your health care provider if you experience any of the following:  redness, tenderness, or signs of infection (pain, swelling, redness, odor or green/yellow discharge around incision site)     Notify your health care provider if you experience any of the following:  difficulty breathing or increased cough     Notify your health care provider if you experience any of the following:  severe persistent headache     Notify your  health care provider if you experience any of the following:   Order Comments: Increased pain     Change dressing (specify)   Order Comments: If aquasel dressing, do not remove dressing. And you may shower with it on.  It will be removed in the office at one week.     Medications:  Current Discharge Medication List      START taking these medications    Details   docusate sodium (COLACE) 100 MG capsule Take 2 capsules (200 mg total) by mouth 2 (two) times daily as needed for Constipation.  Refills: 0      HYDROcodone-acetaminophen (NORCO) 7.5-325 mg per tablet Take 1 tablet by mouth every 4 (four) hours as needed.  Qty: 20 tablet, Refills: 0         CONTINUE these medications which have NOT CHANGED    Details   prenat.vits,alexa,min-iron-folic Tab Take by mouth once daily.             Francois Frias MD  Obstetrics  [facility]

## 2018-09-10 NOTE — PLAN OF CARE
09/10/18 1606   Final Note   Assessment Type Final Discharge Note   Discharge Disposition Home   What phone number can be called within the next 1-3 days to see how you are doing after discharge? [phone]   Hospital Follow Up  Appt(s) scheduled? Yes   Discharge plans and expectations educations in teach back method with documentation complete? Yes   Called patient with follow up appointment. Demonstrated understanding by verbal feedback. Denies any discharge needs.

## 2018-11-05 ENCOUNTER — TELEPHONE (OUTPATIENT)
Dept: SURGERY | Facility: CLINIC | Age: 33
End: 2018-11-05

## 2018-11-15 ENCOUNTER — OFFICE VISIT (OUTPATIENT)
Dept: SURGERY | Facility: CLINIC | Age: 33
End: 2018-11-15
Payer: MEDICAID

## 2018-11-15 VITALS
HEIGHT: 65 IN | HEART RATE: 61 BPM | WEIGHT: 173 LBS | TEMPERATURE: 97 F | OXYGEN SATURATION: 94 % | BODY MASS INDEX: 28.82 KG/M2 | SYSTOLIC BLOOD PRESSURE: 160 MMHG | DIASTOLIC BLOOD PRESSURE: 120 MMHG

## 2018-11-15 DIAGNOSIS — R19.06 EPIGASTRIC MASS: Primary | ICD-10-CM

## 2018-11-15 DIAGNOSIS — R19.00 ABDOMINAL MASS, UNSPECIFIED ABDOMINAL LOCATION: ICD-10-CM

## 2018-11-15 PROCEDURE — 99204 OFFICE O/P NEW MOD 45 MIN: CPT | Mod: S$GLB,,, | Performed by: SURGERY

## 2018-11-20 NOTE — PROGRESS NOTES
[facility] H&P Note    Subjective:       Patient ID: Linda Hart is a 33 y.o. female.    Chief Complaint: Consult and Hernia    HPI:  Linda Hart is a 33 y.o. female with a history of anemia, hypertension recent pregnancy now the post partum phase for the last 2 months presents today as a new patient referral from Dr. Frias for evaluation of epigastric pain with bulge.  Patient stated that during her pregnancy she noticed a significant bulge superior to umbilicus in her epigastrium.  This bulge decreased in size after her pregnancy was finished and now in her postpartum phase.  She since this time has had symptoms which are better and decreased pain however she still feels a bulge at times.  No nausea vomiting.  No bowel habit changes.  Patient states that she would like it evaluated and possibly treated if possible with surgery. Patient now presents today as a new patient referral.  Of note her pain is a 4/10 at times.    Past Medical History:   Diagnosis Date    Anemia     Herpes simplex virus (HSV) infection     Hypertension      Past Surgical History:   Procedure Laterality Date    DILATION AND CURETTAGE OF UTERUS       Family History   Problem Relation Age of Onset    Hyperlipidemia Sister      Social History     Socioeconomic History    Marital status: Single     Spouse name: None    Number of children: None    Years of education: None    Highest education level: None   Social Needs    Financial resource strain: None    Food insecurity - worry: None    Food insecurity - inability: None    Transportation needs - medical: None    Transportation needs - non-medical: None   Occupational History    None   Tobacco Use    Smoking status: Never Smoker   Substance and Sexual Activity    Alcohol use: No     Frequency: Never    Drug use: No    Sexual activity: Yes     Partners: Male     Birth control/protection: None   Other Topics Concern    None   Social History Narrative    None  "      No current outpatient medications on file.     No current facility-administered medications for this visit.      Review of patient's allergies indicates:  No Known Allergies    Review of Systems   Constitutional: Negative for activity change, appetite change, chills and fever.   HENT: Negative for congestion, dental problem and ear discharge.    Eyes: Negative for discharge and itching.   Respiratory: Negative for apnea, choking and chest tightness.    Cardiovascular: Negative for chest pain and leg swelling.   Gastrointestinal: Positive for abdominal pain. Negative for abdominal distention, anal bleeding, constipation, diarrhea and nausea.   Endocrine: Negative for cold intolerance and heat intolerance.   Genitourinary: Negative for difficulty urinating and dyspareunia.   Musculoskeletal: Negative for arthralgias and back pain.   Skin: Negative for color change and pallor.   Neurological: Negative for dizziness and facial asymmetry.   Hematological: Negative for adenopathy. Does not bruise/bleed easily.   Psychiatric/Behavioral: Negative for agitation and behavioral problems.       Objective:      Vitals:    11/15/18 1556   BP: (!) 160/120   Pulse: 61   Temp: 97 °F (36.1 °C)   TempSrc: Oral   SpO2: (!) 94%   Weight: 78.5 kg (173 lb)   Height: 5' 5" (1.651 m)     Physical Exam   Constitutional: She is oriented to person, place, and time. She appears well-developed and well-nourished. No distress.   HENT:   Head: Normocephalic and atraumatic.   Eyes: EOM are normal. Pupils are equal, round, and reactive to light.   Neck: Normal range of motion. Neck supple. No thyromegaly present.   Cardiovascular: Normal rate and regular rhythm.   Pulmonary/Chest: Effort normal and breath sounds normal.   Abdominal: Soft. Bowel sounds are normal. She exhibits no distension. There is no tenderness.       Musculoskeletal: Normal range of motion. She exhibits no edema or deformity.   Neurological: She is alert and oriented to " person, place, and time. No cranial nerve deficit.   Skin: Skin is warm. Capillary refill takes less than 2 seconds. No erythema.   Psychiatric: She has a normal mood and affect. Her behavior is normal.       Lab Review:   CBC:   Lab Results   Component Value Date    WBC 13.79 (H) 09/08/2018    RBC 3.87 (L) 09/08/2018    HGB 10.9 (L) 09/08/2018    HCT 33.0 (L) 09/08/2018     09/08/2018     BMP:   Lab Results   Component Value Date    GLU 90 09/06/2018     (L) 09/06/2018    K 3.9 09/06/2018     09/06/2018    CO2 21 (L) 09/06/2018    BUN 7 09/06/2018    CREATININE 0.6 09/06/2018    CALCIUM 8.8 09/06/2018        Assessment:       1. Epigastric mass    2. Abdominal mass, unspecified abdominal location        Plan:   Epigastric mass  -     CT Abdomen Pelvis  Without Contrast; Future; Expected date: 11/15/2018    Abdominal mass, unspecified abdominal location    Other orders  -     Cancel: CBC auto differential; Future; Expected date: 02/13/2019  -     Cancel: Comprehensive metabolic panel; Future; Expected date: 02/13/2019  -     Cancel: EKG 12-lead; Future; Expected date: 02/13/2019        Medical Decision Making/Counseling:  Patient with epigastric pain and abdominal wall mass.  Patient has an element of diastasis as well. I am unsure of the etiology of her abdominal wall mass.  Pain associated with the mass.  No nausea vomiting.  Normal bowel movements.  This mass is not reducible to suggest a would reducible hernia.  Ultimately I believe this mass needs further delineation with CT scan evaluation.  Ultimately if mass determined to be hernia, I believe the patient would be a repair candidate given her symptomatology associated with it.  Proceed with CT scan in follow-up in 1-2 weeks.  Total clinic time spent today 45 min of which greater than half the time spent in face-to-face counseling

## 2018-11-29 ENCOUNTER — TELEPHONE (OUTPATIENT)
Dept: SURGERY | Facility: CLINIC | Age: 33
End: 2018-11-29

## 2018-11-29 NOTE — TELEPHONE ENCOUNTER
Writer MANINDER for pt to call clinic, CT denied, more test need to be completed before CT approved.

## 2019-02-03 ENCOUNTER — HOSPITAL ENCOUNTER (EMERGENCY)
Facility: HOSPITAL | Age: 34
Discharge: HOME | End: 2019-02-03
Attending: FAMILY MEDICINE
Payer: COMMERCIAL

## 2019-02-03 VITALS
SYSTOLIC BLOOD PRESSURE: 177 MMHG | OXYGEN SATURATION: 98 % | TEMPERATURE: 98 F | HEART RATE: 69 BPM | HEIGHT: 67 IN | WEIGHT: 170 LBS | BODY MASS INDEX: 26.68 KG/M2 | DIASTOLIC BLOOD PRESSURE: 100 MMHG | RESPIRATION RATE: 20 BRPM

## 2019-02-03 DIAGNOSIS — K43.9 VENTRAL HERNIA WITHOUT OBSTRUCTION OR GANGRENE: Primary | ICD-10-CM

## 2019-02-03 LAB
AMPHET+METHAMPHET UR QL: NEGATIVE
B-HCG UR QL: NEGATIVE
BARBITURATES UR QL SCN>200 NG/ML: NEGATIVE
BENZODIAZ UR QL SCN>200 NG/ML: NEGATIVE
BILIRUB UR QL STRIP: NEGATIVE
BZE UR QL SCN: NEGATIVE
CANNABINOIDS UR QL SCN: NEGATIVE
CLARITY UR: CLEAR
COLOR UR: YELLOW
CREAT UR-MCNC: 240.6 MG/DL
GLUCOSE UR QL STRIP: NEGATIVE
HGB UR QL STRIP: NEGATIVE
KETONES UR QL STRIP: ABNORMAL
LEUKOCYTE ESTERASE UR QL STRIP: NEGATIVE
NITRITE UR QL STRIP: NEGATIVE
OPIATES UR QL SCN: NEGATIVE
PCP UR QL SCN>25 NG/ML: NEGATIVE
PH UR STRIP: >8 [PH] (ref 5–8)
PROT UR QL STRIP: NEGATIVE
SP GR UR STRIP: 1.02 (ref 1–1.03)
TOXICOLOGY INFORMATION: NORMAL
URN SPEC COLLECT METH UR: ABNORMAL
UROBILINOGEN UR STRIP-ACNC: 1 EU/DL

## 2019-02-03 PROCEDURE — 25000003 PHARM REV CODE 250: Performed by: FAMILY MEDICINE

## 2019-02-03 PROCEDURE — 99283 EMERGENCY DEPT VISIT LOW MDM: CPT

## 2019-02-03 PROCEDURE — 81025 URINE PREGNANCY TEST: CPT

## 2019-02-03 PROCEDURE — 81003 URINALYSIS AUTO W/O SCOPE: CPT | Mod: 59

## 2019-02-03 PROCEDURE — 80307 DRUG TEST PRSMV CHEM ANLYZR: CPT

## 2019-02-03 RX ORDER — KETOROLAC TROMETHAMINE 10 MG/1
10 TABLET, FILM COATED ORAL EVERY 6 HOURS PRN
Qty: 12 TABLET | Refills: 0 | Status: SHIPPED | OUTPATIENT
Start: 2019-02-03 | End: 2019-02-28

## 2019-02-03 RX ADMIN — LIDOCAINE HYDROCHLORIDE: 20 SOLUTION ORAL; TOPICAL at 10:02

## 2019-02-04 NOTE — DISCHARGE INSTRUCTIONS
Return to the ER at any time if condition changes or worsens or new symptoms develop.  Avoid tight clothing, avoid abdominal distention.  Follow-up with Dr. Chambers in the next week for further recommendations for treatment.

## 2019-02-04 NOTE — ED PROVIDER NOTES
Encounter Date: 2/3/2019       History     Chief Complaint   Patient presents with    abdominal knot     seen by Dr. Chambers on 11/29/18, was scheduled for CT, patient did not follow up, wants checked tonight, still hurting     Patient presents to the ED for evaluation of swelling to abdomen.  Patient states she was seen by Dr. Chambers a couple months prior, was scheduled to have CT but did not.  's note was consulted, CT ordered to differentiate between the hernia and some other lesion.  Patient denies any new issues with her abdomen, states that pain has just continued and she is ready for resolution of her symptoms.          Review of patient's allergies indicates:  No Known Allergies  Past Medical History:   Diagnosis Date    Anemia     Herpes simplex virus (HSV) infection     Hypertension      Past Surgical History:   Procedure Laterality Date    DILATION AND CURETTAGE OF UTERUS       Family History   Problem Relation Age of Onset    Hyperlipidemia Sister      Social History     Tobacco Use    Smoking status: Never Smoker   Substance Use Topics    Alcohol use: No     Frequency: Never    Drug use: No     Review of Systems   Constitutional: Positive for fever.   Gastrointestinal: Positive for abdominal pain. Negative for vomiting.   Genitourinary: Negative.        Physical Exam     Initial Vitals [02/03/19 2117]   BP Pulse Resp Temp SpO2   (!) 177/100 69 20 98.1 °F (36.7 °C) 98 %      MAP       --         Physical Exam    Nursing note and vitals reviewed.  Constitutional: She appears well-developed and well-nourished. She is not diaphoretic. No distress.   HENT:   Head: Normocephalic and atraumatic.   Neck: Normal range of motion. Neck supple.   Pulmonary/Chest: No respiratory distress.   Abdominal: Soft. Bowel sounds are normal. She exhibits no distension. There is no tenderness. There is no rebound and no guarding.   Approximately grapefruit sized ventral hernia appreciated, easily  reducible with patient lying supine.   Musculoskeletal: Normal range of motion.   Neurological: She is alert and oriented to person, place, and time. GCS score is 15. GCS eye subscore is 4. GCS verbal subscore is 5. GCS motor subscore is 6.   Skin: Skin is warm and dry. Capillary refill takes less than 2 seconds.   Psychiatric: She has a normal mood and affect. Her behavior is normal. Judgment and thought content normal.         ED Course   Procedures  Labs Reviewed   URINALYSIS, REFLEX TO URINE CULTURE - Abnormal; Notable for the following components:       Result Value    pH, UA >8.0 (*)     Ketones, UA Trace (*)     All other components within normal limits    Narrative:     Preferred Collection Type->Urine, Clean Catch   DRUG SCREEN PANEL, URINE EMERGENCY   PREGNANCY TEST, URINE RAPID          Imaging Results    None          Medical Decision Making:   ED Management:  Abdominal examination was fairly straightforward, appearance consistent with ventral hernia, very easily reducible with no discomfort noted. Opted not to perform CT due to obvious diagnosis of ventral hernia.  Recommended the patient follow-up with Dr. Chambers to discuss plan for repair.                      Clinical Impression:   The encounter diagnosis was Ventral hernia without obstruction or gangrene.                             Yue Terrell MD  02/04/19 0589

## 2019-02-05 ENCOUNTER — TELEPHONE (OUTPATIENT)
Dept: SURGERY | Facility: CLINIC | Age: 34
End: 2019-02-05

## 2019-02-05 NOTE — TELEPHONE ENCOUNTER
----- Message from Dahlia Florentino sent at 2/5/2019 10:11 AM CST -----  Contact: self  Patient need to speak to nurse regarding scheduling procedure       Please call to advice [phone]

## 2019-02-07 ENCOUNTER — OFFICE VISIT (OUTPATIENT)
Dept: SURGERY | Facility: CLINIC | Age: 34
End: 2019-02-07
Payer: COMMERCIAL

## 2019-02-07 VITALS
HEART RATE: 67 BPM | TEMPERATURE: 98 F | RESPIRATION RATE: 18 BRPM | WEIGHT: 168 LBS | DIASTOLIC BLOOD PRESSURE: 102 MMHG | HEIGHT: 67 IN | OXYGEN SATURATION: 97 % | BODY MASS INDEX: 26.37 KG/M2 | SYSTOLIC BLOOD PRESSURE: 170 MMHG

## 2019-02-07 DIAGNOSIS — R19.06 EPIGASTRIC MASS: Primary | ICD-10-CM

## 2019-02-07 DIAGNOSIS — M62.08 DIASTASIS OF RECTUS ABDOMINIS: ICD-10-CM

## 2019-02-07 PROCEDURE — 3008F BODY MASS INDEX DOCD: CPT | Mod: CPTII,S$GLB,, | Performed by: SURGERY

## 2019-02-07 PROCEDURE — 99213 PR OFFICE/OUTPT VISIT, EST, LEVL III, 20-29 MIN: ICD-10-PCS | Mod: S$GLB,,, | Performed by: SURGERY

## 2019-02-07 PROCEDURE — 99213 OFFICE O/P EST LOW 20 MIN: CPT | Mod: S$GLB,,, | Performed by: SURGERY

## 2019-02-07 PROCEDURE — 3008F PR BODY MASS INDEX (BMI) DOCUMENTED: ICD-10-PCS | Mod: CPTII,S$GLB,, | Performed by: SURGERY

## 2019-02-07 NOTE — PROGRESS NOTES
[facility] Surgery  Follow-up    Subjective:       Patient ID: Linda Hart is a 33 y.o. female.    Chief Complaint: Consult      HPI:  Linda Hart is a 33 y.o. female presents today for follow-up examination of epigastric mass.  Patient has had epigastric mass since last visit.  She noticed it while pregnant.  Patient stated that she has bulging of her epigastrium at times.  Worse with straining.  Also with sitting up from a lie down position. On examination patient has evidence of diastasis rectus.  There is a spot in her mid epigastrium that has a feeling of an epigastric type hernia however currently is just epigastric mass.  No obstructive symptoms.  No nausea vomiting.  No other symptoms to suggest any other etiology.  Patient ordered CT scan at last visit to evaluate abdominal wall musculature given patient's diastasis in the setting of a possible epigastric mass versus ventral hernia however insurance has denied this claim.  Patient must have imaging evaluation of this epigastrium in the setting of a diastasis with a possible ventral hernia versus epigastric mass further delineation prior to surgical intervention.  Patient voiced understanding.  No new complaints since last visit.    Review of Systems   Constitutional: Negative for activity change, appetite change, chills and fever.   HENT: Negative for congestion, dental problem and ear discharge.    Eyes: Negative for discharge and itching.   Respiratory: Negative for apnea, choking and chest tightness.    Cardiovascular: Negative for chest pain and leg swelling.   Gastrointestinal: Positive for abdominal pain. Negative for abdominal distention, anal bleeding, constipation, diarrhea and nausea.   Endocrine: Negative for cold intolerance and heat intolerance.   Genitourinary: Negative for difficulty urinating and dyspareunia.   Musculoskeletal: Negative for arthralgias and back pain.   Skin: Negative for color change and pallor.   Neurological:  "Negative for dizziness and facial asymmetry.   Hematological: Negative for adenopathy. Does not bruise/bleed easily.   Psychiatric/Behavioral: Negative for agitation and behavioral problems.       Objective:      Vitals:    02/07/19 1001   BP: (!) 170/102   Pulse: 67   Resp: 18   Temp: 98.4 °F (36.9 °C)   TempSrc: Oral   SpO2: 97%   Weight: 76.2 kg (168 lb)   Height: 5' 7" (1.702 m)     Physical Exam   Constitutional: She is oriented to person, place, and time. She appears well-developed and well-nourished. No distress.   HENT:   Head: Normocephalic and atraumatic.   Eyes: EOM are normal. Pupils are equal, round, and reactive to light.   Neck: Normal range of motion. Neck supple. No thyromegaly present.   Cardiovascular: Normal rate and regular rhythm.   Pulmonary/Chest: Effort normal and breath sounds normal.   Abdominal: Soft. Bowel sounds are normal. She exhibits no distension. There is tenderness in the epigastric area.       Musculoskeletal: Normal range of motion. She exhibits no edema or deformity.   Neurological: She is alert and oriented to person, place, and time. No cranial nerve deficit.   Skin: Skin is warm. Capillary refill takes less than 2 seconds. No erythema.   Psychiatric: She has a normal mood and affect. Her behavior is normal.        Assessment:       1. Epigastric mass    2. Diastasis of rectus abdominis        Plan:   Epigastric mass  -     US Abdomen Limited; Future; Expected date: 02/07/2019    Diastasis of rectus abdominis        Medical Decision Making/Counseling:  Patient with epigastric mass and diastasis rectus.  I cannot truly differentiate between epigastric mass and epigastric hernia.  I cannot fully appreciate a fascial defect today on examination.  Patient however does have diastasis rectus which worsens the clinical picture in relation to being able to identify correctly if there is a hernia present or not.  Ultimately the patient must have imaging prior to possible surgical " intervention.  I have tried to order a CT scan however insurance has denied.  Will proceed with ultrasound today and have the patient follow up in 1 week.  If ultrasound insufficient in relation to fully delineating the patient's abdominal wall and describing the dimensions of the defect in relation to the underlying diastasis rectus 2 which makes this patient's care more difficult, then the patient will necessitate a CT scan evaluation to delineate her abdominal wall anatomy for surgical road mapping.  It is imperative that we obtain imaging prior to surgical intervention.  Total clinic time spent today with patient 30 min of which greater than half the time spent face-to-face counseling.  Follow-up 1 week    Patient instructed that best way to communicate with my office staff is for patient to get on the [facility] epic patient portal to expedite communication and communication issues that may occur.  Patient was given instructions on how to get on the portal.  I encouraged patient to obtain portal access as well.  Ultimately it is up to the patient to obtain access.  Patient voiced uncerstanding.

## 2019-02-14 ENCOUNTER — OFFICE VISIT (OUTPATIENT)
Dept: SURGERY | Facility: CLINIC | Age: 34
End: 2019-02-14
Attending: SURGERY
Payer: COMMERCIAL

## 2019-02-14 ENCOUNTER — HOSPITAL ENCOUNTER (OUTPATIENT)
Dept: RADIOLOGY | Facility: HOSPITAL | Age: 34
Discharge: HOME | End: 2019-02-14
Attending: SURGERY
Payer: COMMERCIAL

## 2019-02-14 VITALS
HEART RATE: 67 BPM | TEMPERATURE: 98 F | OXYGEN SATURATION: 97 % | BODY MASS INDEX: 26.11 KG/M2 | WEIGHT: 166.69 LBS | DIASTOLIC BLOOD PRESSURE: 102 MMHG | RESPIRATION RATE: 18 BRPM | SYSTOLIC BLOOD PRESSURE: 146 MMHG

## 2019-02-14 DIAGNOSIS — R16.0 LIVER MASS: Primary | ICD-10-CM

## 2019-02-14 DIAGNOSIS — R19.06 EPIGASTRIC MASS: ICD-10-CM

## 2019-02-14 DIAGNOSIS — K43.9 VENTRAL HERNIA WITHOUT OBSTRUCTION OR GANGRENE: ICD-10-CM

## 2019-02-14 PROCEDURE — 99213 OFFICE O/P EST LOW 20 MIN: CPT | Mod: S$GLB,,, | Performed by: SURGERY

## 2019-02-14 PROCEDURE — 76705 ECHO EXAM OF ABDOMEN: CPT | Mod: 26,,, | Performed by: RADIOLOGY

## 2019-02-14 PROCEDURE — 76705 US ABDOMEN LIMITED: ICD-10-PCS | Mod: 26,,, | Performed by: RADIOLOGY

## 2019-02-14 PROCEDURE — 3008F PR BODY MASS INDEX (BMI) DOCUMENTED: ICD-10-PCS | Mod: CPTII,S$GLB,, | Performed by: SURGERY

## 2019-02-14 PROCEDURE — 99213 PR OFFICE/OUTPT VISIT, EST, LEVL III, 20-29 MIN: ICD-10-PCS | Mod: S$GLB,,, | Performed by: SURGERY

## 2019-02-14 PROCEDURE — 3008F BODY MASS INDEX DOCD: CPT | Mod: CPTII,S$GLB,, | Performed by: SURGERY

## 2019-02-14 PROCEDURE — 76705 ECHO EXAM OF ABDOMEN: CPT | Mod: TC

## 2019-02-17 NOTE — PROGRESS NOTES
[facility] Surgery  Follow-up    Subjective:       Patient ID: Linda Hart is a 33 y.o. female.    Chief Complaint: Follow-up      HPI:  Linda Hart is a 33 y.o. female presents today for follow-up examination after ultrasound of abdomen for abdominal wall bulge/mass.  Ultrasound confirmed the diagnosis of ventral hernia.  Appears to be fat containing.  Ultrasound also showed a questionable liver mass.  Small in nature.  Possible hemangioma however cannot rule out significant liver mass etiologies with ultrasound alone.  Recommendation was for a dedicated CT scan abdomen pelvis to rule out significant liver mass.  Patient since last visit has done well. No new issues today.    Review of Systems   Constitutional: Negative for activity change, appetite change, chills and fever.   HENT: Negative for congestion, dental problem and ear discharge.    Eyes: Negative for discharge and itching.   Respiratory: Negative for apnea, choking and chest tightness.    Cardiovascular: Negative for chest pain and leg swelling.   Gastrointestinal: Negative for abdominal distention, abdominal pain, anal bleeding, constipation, diarrhea and nausea.   Endocrine: Negative for cold intolerance and heat intolerance.   Genitourinary: Negative for difficulty urinating and dyspareunia.   Musculoskeletal: Negative for arthralgias and back pain.   Skin: Negative for color change and pallor.   Neurological: Negative for dizziness and facial asymmetry.   Hematological: Negative for adenopathy. Does not bruise/bleed easily.   Psychiatric/Behavioral: Negative for agitation and behavioral problems.       Objective:      Vitals:    02/14/19 1518   BP: (!) 146/102   BP Location: Left arm   Patient Position: Sitting   BP Method: Medium (Automatic)   Pulse: 67   Resp: 18   Temp: 98.3 °F (36.8 °C)   TempSrc: Oral   SpO2: 97%   Weight: 75.6 kg (166 lb 11.2 oz)     Physical Exam   Constitutional: She is oriented to person, place, and time. She  appears well-developed and well-nourished. No distress.   HENT:   Head: Normocephalic and atraumatic.   Eyes: EOM are normal. Pupils are equal, round, and reactive to light.   Neck: Normal range of motion. Neck supple. No thyromegaly present.   Cardiovascular: Normal rate and regular rhythm.   Pulmonary/Chest: Effort normal and breath sounds normal.   Abdominal: Soft. Bowel sounds are normal. She exhibits no distension. There is no tenderness. A hernia is present.       Musculoskeletal: Normal range of motion. She exhibits no edema or deformity.   Neurological: She is alert and oriented to person, place, and time. No cranial nerve deficit.   Skin: Skin is warm. Capillary refill takes less than 2 seconds. No erythema.   Psychiatric: She has a normal mood and affect. Her behavior is normal.     Diagnostics Review: US: Reviewed ventral hernia confirmed.  Questionable liver mass seen.     Assessment:       1. Liver mass    2. Ventral hernia without obstruction or gangrene        Plan:   Liver mass  -     CT Abdomen Pelvis W Wo Contrast; Future; Expected date: 02/14/2019    Ventral hernia without obstruction or gangrene  -     CT Abdomen Pelvis W Wo Contrast; Future; Expected date: 02/14/2019        Medical Decision Making/Counseling:  Patient with incidental finding of liver mass found on ultrasound.  Ultrasound confirmed evidence of ventral hernia.  Recommendations at this time will be to undergo CT scan abdomen pelvis with liver protocol for evaluation of this liver mass as recommended by Radiology.  Patient will follow up in 1-2 weeks with my clinic for further evaluation of this liver mass as well as evaluation of a ventral hernia.

## 2019-02-21 ENCOUNTER — HOSPITAL ENCOUNTER (OUTPATIENT)
Dept: RADIOLOGY | Facility: HOSPITAL | Age: 34
Discharge: HOME | End: 2019-02-21
Attending: SURGERY
Payer: COMMERCIAL

## 2019-02-21 DIAGNOSIS — K43.9 VENTRAL HERNIA WITHOUT OBSTRUCTION OR GANGRENE: ICD-10-CM

## 2019-02-21 DIAGNOSIS — R16.0 LIVER MASS: ICD-10-CM

## 2019-02-21 PROCEDURE — 25500020 PHARM REV CODE 255: Performed by: SURGERY

## 2019-02-21 PROCEDURE — 74178 CT ABDOMEN PELVIS W WO CONTRAST: ICD-10-PCS | Mod: 26,,, | Performed by: RADIOLOGY

## 2019-02-21 PROCEDURE — 74178 CT ABD&PLV WO CNTR FLWD CNTR: CPT | Mod: 26,,, | Performed by: RADIOLOGY

## 2019-02-21 PROCEDURE — 74178 CT ABD&PLV WO CNTR FLWD CNTR: CPT | Mod: TC

## 2019-02-21 RX ADMIN — IOHEXOL 75 ML: 350 INJECTION, SOLUTION INTRAVENOUS at 12:02

## 2019-02-21 RX ADMIN — IOHEXOL 15 ML: 300 INJECTION, SOLUTION INTRAVENOUS at 10:02

## 2019-02-21 RX ADMIN — IOHEXOL 15 ML: 300 INJECTION, SOLUTION INTRAVENOUS at 11:02

## 2019-02-28 ENCOUNTER — OFFICE VISIT (OUTPATIENT)
Dept: SURGERY | Facility: CLINIC | Age: 34
End: 2019-02-28
Payer: COMMERCIAL

## 2019-02-28 VITALS
SYSTOLIC BLOOD PRESSURE: 168 MMHG | HEIGHT: 65 IN | RESPIRATION RATE: 20 BRPM | OXYGEN SATURATION: 95 % | DIASTOLIC BLOOD PRESSURE: 104 MMHG | WEIGHT: 169 LBS | HEART RATE: 64 BPM | TEMPERATURE: 98 F | BODY MASS INDEX: 28.16 KG/M2

## 2019-02-28 DIAGNOSIS — K43.9 VENTRAL HERNIA WITHOUT OBSTRUCTION OR GANGRENE: ICD-10-CM

## 2019-02-28 DIAGNOSIS — R16.0 LIVER MASS: Primary | ICD-10-CM

## 2019-02-28 PROCEDURE — 3008F BODY MASS INDEX DOCD: CPT | Mod: CPTII,S$GLB,, | Performed by: SURGERY

## 2019-02-28 PROCEDURE — 99213 PR OFFICE/OUTPT VISIT, EST, LEVL III, 20-29 MIN: ICD-10-PCS | Mod: S$GLB,,, | Performed by: SURGERY

## 2019-02-28 PROCEDURE — 99213 OFFICE O/P EST LOW 20 MIN: CPT | Mod: S$GLB,,, | Performed by: SURGERY

## 2019-02-28 PROCEDURE — 3008F PR BODY MASS INDEX (BMI) DOCUMENTED: ICD-10-PCS | Mod: CPTII,S$GLB,, | Performed by: SURGERY

## 2019-03-05 NOTE — PROGRESS NOTES
"[facility] Surgery  Follow-up    Subjective:       Patient ID: Linda Hart is a 33 y.o. female.    Chief Complaint: Follow-up (CT)      HPI:  Linda Hart is a 33 y.o. female presents today for follow-up examination after CT scan abdomen pelvis to evaluate liver mass.  CT abdomen pelvis unable to fully differentiate the etiology of liver mass.  Hemangioma is expected however not confirmed.  Recommendations by Radiology were to repeat imaging with MRI of liver for further delineation of liver mass.  Patient's CT scan also knows to multiple midline ventral hernia defects, 3 with 2 being near the umbilicus. These are not entirely reducible. Worse symptoms are at the ones near the umbilicus with pain but no issues with constipation, obstipation or nausea/vomiting.  Patient doing well since last visit.    Review of Systems   Constitutional: Negative for activity change, appetite change, chills and fever.   HENT: Negative for congestion, dental problem and ear discharge.    Eyes: Negative for discharge and itching.   Respiratory: Negative for apnea, choking and chest tightness.    Cardiovascular: Negative for chest pain and leg swelling.   Gastrointestinal: Negative for abdominal distention, abdominal pain, anal bleeding, constipation, diarrhea and nausea.   Endocrine: Negative for cold intolerance and heat intolerance.   Genitourinary: Negative for difficulty urinating and dyspareunia.   Musculoskeletal: Negative for arthralgias and back pain.   Skin: Negative for color change and pallor.   Neurological: Negative for dizziness and facial asymmetry.   Hematological: Negative for adenopathy. Does not bruise/bleed easily.   Psychiatric/Behavioral: Negative for agitation and behavioral problems.       Objective:      Vitals:    02/28/19 1357   BP: (!) 168/104   Pulse: 64   Resp: 20   Temp: 98.4 °F (36.9 °C)   TempSrc: Oral   SpO2: 95%   Weight: 76.7 kg (169 lb)   Height: 5' 5" (1.651 m)     Physical Exam "   Constitutional: She is oriented to person, place, and time. She appears well-developed and well-nourished. No distress.   HENT:   Head: Normocephalic and atraumatic.   Eyes: EOM are normal. Pupils are equal, round, and reactive to light.   Neck: Normal range of motion. Neck supple. No thyromegaly present.   Cardiovascular: Normal rate and regular rhythm.   Pulmonary/Chest: Effort normal and breath sounds normal.   Abdominal: Soft. Bowel sounds are normal. She exhibits no distension. There is no tenderness.       Musculoskeletal: Normal range of motion. She exhibits no edema or deformity.   Neurological: She is alert and oriented to person, place, and time. No cranial nerve deficit.   Skin: Skin is warm. Capillary refill takes less than 2 seconds. No erythema.   Psychiatric: She has a normal mood and affect. Her behavior is normal.     Diagnostics Review: CT: Reviewed per his indeterminate origin based upon CT scan, radiologist recommends MRI liver for further delineation.  Cannot exclude neoplasms.     Assessment:       1. Liver mass    2. Ventral hernia without obstruction or gangrene        Plan:   Liver mass  -     MRI Abdomen W WO Contrast; Future; Expected date: 02/28/2019    Ventral hernia without obstruction or gangrene        Medical Decision Making/Counseling:  Patient with 3 ventral hernia defects.  Clinically significant are the ones near the umbilicus.  Patient will need herniorrhaphy however will need for liver lesion to be further characterize and delineated prior to proceeding with herniorrhaphy.    CT scan non conclusive of benign etiology of liver lesion.  Recommendations by radiologist will be for patient to undergo MRI abdomen, liver protocol for further delineation of liver mass.  Patient agreed with the plan.  F/u 2 weeks

## 2019-03-08 ENCOUNTER — HOSPITAL ENCOUNTER (OUTPATIENT)
Dept: RADIOLOGY | Facility: HOSPITAL | Age: 34
Discharge: HOME | End: 2019-03-08
Attending: SURGERY
Payer: COMMERCIAL

## 2019-03-08 DIAGNOSIS — R16.0 LIVER MASS: ICD-10-CM

## 2019-03-08 PROCEDURE — A9585 GADOBUTROL INJECTION: HCPCS | Performed by: SURGERY

## 2019-03-08 PROCEDURE — 74183 MRI ABD W/O CNTR FLWD CNTR: CPT | Mod: TC

## 2019-03-08 PROCEDURE — 74183 MRI ABD W/O CNTR FLWD CNTR: CPT | Mod: 26,,, | Performed by: RADIOLOGY

## 2019-03-08 PROCEDURE — 74183 MRI ABDOMEN W WO CONTRAST: ICD-10-PCS | Mod: 26,,, | Performed by: RADIOLOGY

## 2019-03-08 PROCEDURE — 25500020 PHARM REV CODE 255: Performed by: SURGERY

## 2019-03-08 RX ORDER — GADOBUTROL 604.72 MG/ML
7 INJECTION INTRAVENOUS
Status: COMPLETED | OUTPATIENT
Start: 2019-03-08 | End: 2019-03-08

## 2019-03-08 RX ADMIN — GADOBUTROL 7 ML: 604.72 INJECTION INTRAVENOUS at 08:03

## 2019-03-14 ENCOUNTER — OFFICE VISIT (OUTPATIENT)
Dept: SURGERY | Facility: CLINIC | Age: 34
End: 2019-03-14
Payer: COMMERCIAL

## 2019-03-14 ENCOUNTER — OFFICE VISIT (OUTPATIENT)
Dept: FAMILY MEDICINE | Facility: CLINIC | Age: 34
End: 2019-03-14
Payer: COMMERCIAL

## 2019-03-14 VITALS
HEART RATE: 65 BPM | SYSTOLIC BLOOD PRESSURE: 137 MMHG | OXYGEN SATURATION: 98 % | WEIGHT: 169 LBS | HEIGHT: 65 IN | DIASTOLIC BLOOD PRESSURE: 88 MMHG | RESPIRATION RATE: 18 BRPM | BODY MASS INDEX: 28.16 KG/M2

## 2019-03-14 VITALS
SYSTOLIC BLOOD PRESSURE: 141 MMHG | WEIGHT: 171 LBS | DIASTOLIC BLOOD PRESSURE: 100 MMHG | BODY MASS INDEX: 28.49 KG/M2 | HEIGHT: 65 IN | OXYGEN SATURATION: 95 % | RESPIRATION RATE: 18 BRPM | HEART RATE: 72 BPM

## 2019-03-14 DIAGNOSIS — I10 ESSENTIAL HYPERTENSION: Primary | ICD-10-CM

## 2019-03-14 DIAGNOSIS — K43.7 VENTRAL HERNIA WITH GANGRENE: Primary | ICD-10-CM

## 2019-03-14 PROCEDURE — 3008F BODY MASS INDEX DOCD: CPT | Mod: CPTII,S$GLB,, | Performed by: NURSE PRACTITIONER

## 2019-03-14 PROCEDURE — 99214 PR OFFICE/OUTPT VISIT, EST, LEVL IV, 30-39 MIN: ICD-10-PCS | Mod: S$GLB,,, | Performed by: SURGERY

## 2019-03-14 PROCEDURE — 3008F PR BODY MASS INDEX (BMI) DOCUMENTED: ICD-10-PCS | Mod: CPTII,S$GLB,, | Performed by: SURGERY

## 2019-03-14 PROCEDURE — 3008F PR BODY MASS INDEX (BMI) DOCUMENTED: ICD-10-PCS | Mod: CPTII,S$GLB,, | Performed by: NURSE PRACTITIONER

## 2019-03-14 PROCEDURE — 99214 OFFICE O/P EST MOD 30 MIN: CPT | Mod: S$GLB,,, | Performed by: SURGERY

## 2019-03-14 PROCEDURE — 3008F BODY MASS INDEX DOCD: CPT | Mod: CPTII,S$GLB,, | Performed by: SURGERY

## 2019-03-14 PROCEDURE — 99214 PR OFFICE/OUTPT VISIT, EST, LEVL IV, 30-39 MIN: ICD-10-PCS | Mod: S$GLB,,, | Performed by: NURSE PRACTITIONER

## 2019-03-14 PROCEDURE — 99214 OFFICE O/P EST MOD 30 MIN: CPT | Mod: S$GLB,,, | Performed by: NURSE PRACTITIONER

## 2019-03-14 RX ORDER — LIDOCAINE HYDROCHLORIDE 10 MG/ML
1 INJECTION, SOLUTION EPIDURAL; INFILTRATION; INTRACAUDAL; PERINEURAL ONCE
Status: DISCONTINUED | OUTPATIENT
Start: 2019-03-14 | End: 2020-02-04 | Stop reason: HOSPADM

## 2019-03-14 RX ORDER — AMLODIPINE BESYLATE 5 MG/1
5 TABLET ORAL DAILY
Qty: 30 TABLET | Refills: 2 | Status: SHIPPED | OUTPATIENT
Start: 2019-03-14 | End: 2020-06-23

## 2019-03-14 RX ORDER — SODIUM CHLORIDE 9 MG/ML
INJECTION, SOLUTION INTRAVENOUS CONTINUOUS
Status: CANCELLED | OUTPATIENT
Start: 2019-03-14

## 2019-03-14 NOTE — PROGRESS NOTES
Chief Complaint  Chief Complaint   Patient presents with    Hypertension       HPI:  Linda Hart is a 33 y.o. female with medical diagnoses as listed and reviewed within the medical history and problem list that presents for evaluation of hypertension. Pt reports that she was encouraged to see PCP for elevated BP. In reviewing her records, it is noted that since November of 2018 pt has had BP with the ranges of -170 and -110. She has no chest pain or SOB. No dizziness. No palpitations. No swelling or headaches.   Pt has been followed by  for multiple hernias and liver hemangiomas. Pt will be having a hernia repair in the near future with a date not scheduled yet.   PAST MEDICAL HISTORY:  Past Medical History:   Diagnosis Date    Anemia     Herpes simplex virus (HSV) infection     Hypertension        PAST SURGICAL HISTORY:  Past Surgical History:   Procedure Laterality Date    DILATION AND CURETTAGE OF UTERUS         SOCIAL HISTORY:  Social History     Socioeconomic History    Marital status: Single     Spouse name: Not on file    Number of children: Not on file    Years of education: Not on file    Highest education level: Not on file   Social Needs    Financial resource strain: Not on file    Food insecurity - worry: Not on file    Food insecurity - inability: Not on file    Transportation needs - medical: Not on file    Transportation needs - non-medical: Not on file   Occupational History    Not on file   Tobacco Use    Smoking status: Never Smoker   Substance and Sexual Activity    Alcohol use: No     Frequency: Never    Drug use: No    Sexual activity: Yes     Partners: Male     Birth control/protection: None   Other Topics Concern    Not on file   Social History Narrative    Not on file       FAMILY HISTORY:  Family History   Problem Relation Age of Onset    Hyperlipidemia Sister        ALLERGIES AND MEDICATIONS: updated and reviewed.  Review of patient's  "allergies indicates:  No Known Allergies  Current Outpatient Medications   Medication Sig Dispense Refill    amLODIPine (NORVASC) 5 MG tablet Take 1 tablet (5 mg total) by mouth once daily. 30 tablet 2     Current Facility-Administered Medications   Medication Dose Route Frequency Provider Last Rate Last Dose    lidocaine (PF) 10 mg/ml (1%) injection 10 mg  1 mL Intradermal Once Amadou Chambers MD             ROS  Review of Systems   Constitutional: Negative for appetite change, chills, fatigue and fever.   HENT: Negative for congestion, postnasal drip, rhinorrhea and sore throat.    Respiratory: Negative for cough, chest tightness, shortness of breath and wheezing.    Cardiovascular: Negative for chest pain and palpitations.   Gastrointestinal: Negative for abdominal distention, abdominal pain, constipation, diarrhea, nausea and vomiting.   Genitourinary: Negative for dysuria.   Musculoskeletal: Negative for myalgias.   Skin: Negative for color change and rash.   Neurological: Negative for dizziness, weakness and headaches.   Psychiatric/Behavioral: Negative for confusion and sleep disturbance. The patient is not nervous/anxious.            PHYSICAL EXAM  Vitals:    03/14/19 1353   BP: (!) 141/100   BP Location: Left arm   Patient Position: Sitting   Pulse: 72   Resp: 18   SpO2: 95%   Weight: 77.6 kg (171 lb)   Height: 5' 5" (1.651 m)    Body mass index is 28.46 kg/m².  Weight: 77.6 kg (171 lb)   Height: 5' 5" (165.1 cm)       Physical Exam   Constitutional: She is oriented to person, place, and time. She appears well-developed and well-nourished.   HENT:   Head: Normocephalic.   Eyes: Pupils are equal, round, and reactive to light.   Neck: Normal range of motion. Neck supple.   Cardiovascular: Normal rate, regular rhythm, S1 normal and S2 normal.   No murmur heard.  Pulmonary/Chest: Effort normal and breath sounds normal. She has no wheezes.   Abdominal: Soft. Normal appearance and bowel sounds are " normal. She exhibits no distension. There is no tenderness. A hernia is present.   Musculoskeletal: Normal range of motion.   Neurological: She is alert and oriented to person, place, and time.   Skin: Skin is warm and dry. Capillary refill takes less than 2 seconds.   Psychiatric: She has a normal mood and affect. Her speech is normal and behavior is normal. Thought content normal. Cognition and memory are normal.   Vitals reviewed.        Health Maintenance       Date Due Completion Date    Lipid Panel 1987 ---    Pap Smear with HPV Cotest 11/10/2008 ---    Influenza Vaccine 08/01/2018 ---    TETANUS VACCINE 09/08/2028 9/8/2018               Assessment & Plan    Linda was seen today for hypertension.    Diagnoses and all orders for this visit:    Essential hypertension  -     CBC auto differential; Future  -     Comprehensive metabolic panel; Future  -     amLODIPine (NORVASC) 5 MG tablet; Take 1 tablet (5 mg total) by mouth once daily.    - Check labs and start CCB. Pt is to come in next week for a BP check whenever she is available to stop here until she can purchase a BP cuff. She is encouraged to monitor BP at same time daily and record for follow up in 4 weeks. She is to call for questions, concerns, or side effects.   - Educated on s/s of high and low BP. She verb understanding.   - Educated on low fat, low cholesterol diet with no added salt.     Follow-up: Follow-up in about 4 weeks (around 4/11/2019).      Risks, benefits, and side effects were discussed with the patient. All questions were answered to the fullest satisfaction of the patient, and pt verbalized understanding and agreement to treatment plan. Pt was to call with any new or worsening symptoms, or present to the ER.

## 2019-03-17 NOTE — H&P
[facility] H&P Note    Subjective:       Patient ID: Linda Hart is a 33 y.o. female.    Chief Complaint: Follow-up (F/U MRI)    HPI:  Linda Hart is a 33 y.o. female with a history of anemia, htn presents today as an established patient for follow-up of ventral hernias.  Patient has undergone a battery of tests.  Ultrasound confirmed hernia formation however also saw liver lesion.  CT A/P Liver protocol couldn't completely r/o neoplastic lesion lesions and recommended follow-up MRI.  MRI showed hemangioma.  Patient now desires hernia repair.  She has a ventral hernia with 3 components superior to her umbilicus which is incarcerated with pre-peritoneal fat which causes pain associated with her recent pregnancy.  No nausea or vomiting.  No changes in bowel habits.  Patient desires surgical repair of her hernia given the pain.pressure it causes.  Pain described as a 6/10.    Past Medical History:   Diagnosis Date    Anemia     Herpes simplex virus (HSV) infection     Hypertension      Past Surgical History:   Procedure Laterality Date    DILATION AND CURETTAGE OF UTERUS       Family History   Problem Relation Age of Onset    Hyperlipidemia Sister      Social History     Socioeconomic History    Marital status: Single     Spouse name: None    Number of children: None    Years of education: None    Highest education level: None   Social Needs    Financial resource strain: None    Food insecurity - worry: None    Food insecurity - inability: None    Transportation needs - medical: None    Transportation needs - non-medical: None   Occupational History    None   Tobacco Use    Smoking status: Never Smoker   Substance and Sexual Activity    Alcohol use: No     Frequency: Never    Drug use: No    Sexual activity: Yes     Partners: Male     Birth control/protection: None   Other Topics Concern    None   Social History Narrative    None       Current Outpatient Medications   Medication  "Sig Dispense Refill    amLODIPine (NORVASC) 5 MG tablet Take 1 tablet (5 mg total) by mouth once daily. 30 tablet 2     Current Facility-Administered Medications   Medication Dose Route Frequency Provider Last Rate Last Dose    lidocaine (PF) 10 mg/ml (1%) injection 10 mg  1 mL Intradermal Once Amadou Chambers MD         Review of patient's allergies indicates:  No Known Allergies    Review of Systems   Constitutional: Negative for activity change, appetite change, chills and fever.   HENT: Negative for congestion, dental problem and ear discharge.    Eyes: Negative for discharge and itching.   Respiratory: Negative for apnea, choking and chest tightness.    Cardiovascular: Negative for chest pain and leg swelling.   Gastrointestinal: Negative for abdominal distention, abdominal pain, anal bleeding, constipation, diarrhea and nausea.   Endocrine: Negative for cold intolerance and heat intolerance.   Genitourinary: Negative for difficulty urinating and dyspareunia.   Musculoskeletal: Negative for arthralgias and back pain.   Skin: Negative for color change and pallor.   Neurological: Negative for dizziness and facial asymmetry.   Hematological: Negative for adenopathy. Does not bruise/bleed easily.   Psychiatric/Behavioral: Negative for agitation and behavioral problems.       Objective:      Vitals:    03/14/19 1131   BP: 137/88   Pulse: 65   Resp: 18   SpO2: 98%   Weight: 76.7 kg (169 lb)   Height: 5' 5" (1.651 m)     Physical Exam   Constitutional: She is oriented to person, place, and time. She appears well-developed and well-nourished. No distress.   HENT:   Head: Normocephalic and atraumatic.   Eyes: EOM are normal. Pupils are equal, round, and reactive to light.   Neck: Normal range of motion. Neck supple. No thyromegaly present.   Cardiovascular: Normal rate and regular rhythm.   Pulmonary/Chest: Effort normal and breath sounds normal.   Abdominal: Soft. Bowel sounds are normal. She exhibits no " distension. There is no tenderness. A hernia is present.       Musculoskeletal: Normal range of motion. She exhibits no edema or deformity.   Neurological: She is alert and oriented to person, place, and time. No cranial nerve deficit.   Skin: Skin is warm. Capillary refill takes less than 2 seconds. No erythema.   Psychiatric: She has a normal mood and affect. Her behavior is normal.       Lab Review:   CBC:   Lab Results   Component Value Date    WBC 13.79 (H) 09/08/2018    RBC 3.87 (L) 09/08/2018    HGB 10.9 (L) 09/08/2018    HCT 33.0 (L) 09/08/2018     09/08/2018     BMP:   Lab Results   Component Value Date    GLU 90 09/06/2018     (L) 09/06/2018    K 3.9 09/06/2018     09/06/2018    CO2 21 (L) 09/06/2018    BUN 7 09/06/2018    CREATININE 0.6 09/06/2018    CALCIUM 8.8 09/06/2018     Diagnostics Review: CT scan ultrasound MRI reviewed.  MRI shows hemangioma of the liver. Benign.  Imaging test confirmed ventral hernia.  There appears to be 3 small defects.  Fat incarcerated.     Assessment:       1. Ventral hernia with gangrene        Plan:   Ventral hernia with gangrene  -     Case Request Operating Room: REPAIR, HERNIA, VENTRAL, INCARCERATED, WITHOUT HISTORY OF PRIOR REPAIR  -     Place in Outpatient; Standing  -     Vital signs; Standing  -     Insert peripheral IV; Standing  -     Verify beta-blocker dose taken within 24 hours if patient is prescribed beta-blocker; Standing  -     Height and weight; Standing  -     Intake and output; Standing  -     Verify discontinuation of antithrombotics; Standing  -     POCT glucose; Standing  -     Verify blood consent; Standing  -     Verify consent; Standing  -     Diet NPO; Standing  -     Place CARLOS ALBERTO hose; Standing  -     Place sequential compression device; Standing  -     Full code; Standing    Other orders  -     Log roll; Standing  -     lidocaine (PF) 10 mg/ml (1%) injection 10 mg  -     0.9%  NaCl infusion  -     IP VTE LOW RISK PATIENT;  Standing  -     vancomycin (VANCOCIN) 1,000 mg in dextrose 5 % 500 mL IVPB  -     ciprofloxacin lactate (CIPRO) 400 mg in dextrose 5 % 200 mL IVPB        Medical Decision Making/Counseling:  Patient with 3 defects, ventral hernia with incarcerated fat.  Patient desires surgical herniorrhaphy.  Risk and benefits of surgical herniorrhaphy were discussed in detail in clinic today.  Patient states that she has pain and pressure associated with ventral hernias.  I discussed with the patient that I am unsure of the best way to repair this, whether with suture repair or underlay mesh.  I discussed with the patient that if each individual defect is small, amenable to suture repair then we will perform that however if each defect attenuated with fascia and close together, best solution would be for opening of each hernia making 1 muscular defect and an underlay of mesh.  Patient in agreement with plan.  Wishes for herniorrhaphy.  Risk and benefits of mesh placement, mesh infection, wound infection, hernia recurrence, injury to surrounding structures to include the bowel, EC fistula formation, etc all discussed in clinic today.  Patient understands the risk and benefits of surgical intervention wishes to proceed with surgical herniorrhaphy in the near future.

## 2019-07-19 ENCOUNTER — OCCUPATIONAL HEALTH (OUTPATIENT)
Dept: URGENT CARE | Facility: CLINIC | Age: 34
End: 2019-07-19

## 2019-07-19 PROCEDURE — 86706 PR  HEPATITIS B SURFACE AB TEST: ICD-10-PCS | Mod: S$GLB,,, | Performed by: EMERGENCY MEDICINE

## 2019-07-19 PROCEDURE — 86706 HEP B SURFACE ANTIBODY: CPT | Mod: S$GLB,,, | Performed by: EMERGENCY MEDICINE

## 2019-07-19 PROCEDURE — 86580 PR  TB INTRADERMAL TEST: ICD-10-PCS | Mod: S$GLB,,, | Performed by: EMERGENCY MEDICINE

## 2019-07-19 PROCEDURE — 80305 PR NON-DOT DRUG SCREENS: ICD-10-PCS | Mod: S$GLB,,, | Performed by: EMERGENCY MEDICINE

## 2019-07-19 PROCEDURE — 86580 TB INTRADERMAL TEST: CPT | Mod: S$GLB,,, | Performed by: EMERGENCY MEDICINE

## 2019-07-19 PROCEDURE — 80305 DRUG TEST PRSMV DIR OPT OBS: CPT | Mod: S$GLB,,, | Performed by: EMERGENCY MEDICINE

## 2019-07-23 LAB — HBV SURFACE AB SER-ACNC: >1000 MIU/ML

## 2019-08-19 PROBLEM — Z3A.37 37 WEEKS GESTATION OF PREGNANCY: Status: RESOLVED | Noted: 2018-09-06 | Resolved: 2019-08-19

## 2020-01-09 ENCOUNTER — OFFICE VISIT (OUTPATIENT)
Dept: SURGERY | Facility: CLINIC | Age: 35
End: 2020-01-09
Payer: COMMERCIAL

## 2020-01-09 VITALS
WEIGHT: 165.38 LBS | SYSTOLIC BLOOD PRESSURE: 161 MMHG | TEMPERATURE: 98 F | DIASTOLIC BLOOD PRESSURE: 94 MMHG | HEART RATE: 80 BPM | HEIGHT: 65 IN | RESPIRATION RATE: 12 BRPM | OXYGEN SATURATION: 99 % | BODY MASS INDEX: 27.56 KG/M2

## 2020-01-09 DIAGNOSIS — K43.6 INCARCERATED VENTRAL HERNIA: Primary | ICD-10-CM

## 2020-01-09 PROCEDURE — 99214 OFFICE O/P EST MOD 30 MIN: CPT | Mod: S$GLB,,, | Performed by: SURGERY

## 2020-01-09 PROCEDURE — 3080F DIAST BP >= 90 MM HG: CPT | Mod: S$GLB,,, | Performed by: SURGERY

## 2020-01-09 PROCEDURE — 99214 PR OFFICE/OUTPT VISIT, EST, LEVL IV, 30-39 MIN: ICD-10-PCS | Mod: S$GLB,,, | Performed by: SURGERY

## 2020-01-09 PROCEDURE — 3008F PR BODY MASS INDEX (BMI) DOCUMENTED: ICD-10-PCS | Mod: S$GLB,,, | Performed by: SURGERY

## 2020-01-09 PROCEDURE — 3077F PR MOST RECENT SYSTOLIC BLOOD PRESSURE >= 140 MM HG: ICD-10-PCS | Mod: S$GLB,,, | Performed by: SURGERY

## 2020-01-09 PROCEDURE — 3080F PR MOST RECENT DIASTOLIC BLOOD PRESSURE >= 90 MM HG: ICD-10-PCS | Mod: S$GLB,,, | Performed by: SURGERY

## 2020-01-09 PROCEDURE — 3077F SYST BP >= 140 MM HG: CPT | Mod: S$GLB,,, | Performed by: SURGERY

## 2020-01-09 PROCEDURE — 3008F BODY MASS INDEX DOCD: CPT | Mod: S$GLB,,, | Performed by: SURGERY

## 2020-01-09 RX ORDER — SODIUM CHLORIDE 9 MG/ML
INJECTION, SOLUTION INTRAVENOUS CONTINUOUS
Status: CANCELLED | OUTPATIENT
Start: 2020-01-09

## 2020-01-09 RX ORDER — LIDOCAINE HYDROCHLORIDE 10 MG/ML
1 INJECTION, SOLUTION EPIDURAL; INFILTRATION; INTRACAUDAL; PERINEURAL ONCE
Status: DISCONTINUED | OUTPATIENT
Start: 2020-01-09 | End: 2020-02-04 | Stop reason: HOSPADM

## 2020-01-09 NOTE — H&P
[facility] H&P Note    Subjective:       Patient ID: Linda Hart is a 34 y.o. female.    Chief Complaint: Consult (Schedule hernia repair)    HPI:  03/2019  Linda Hart is a 34 y.o. female with a history of anemia, htn presents today as an established patient for follow-up of ventral hernias.  Patient has undergone a battery of tests.  Ultrasound confirmed hernia formation however also saw liver lesion.  CT A/P Liver protocol couldn't completely r/o neoplastic lesion lesions and recommended follow-up MRI.  MRI showed hemangioma.  Patient now desires hernia repair.  She has a ventral hernia with 3 components superior to her umbilicus which is incarcerated with pre-peritoneal fat which causes pain associated with her recent pregnancy.  No nausea or vomiting.  No changes in bowel habits.  Patient desires surgical repair of her hernia given the pain.pressure it causes.  Pain described as a 6/10.    1/9/2020  Patient previously scheduled for ventral hernia repair however failed to show up. She canceled hernia repair.  Since patient since last visit nearly 9 months ago has had increasing size increasing pain associated with her ventral hernia. Previous CT scan reviewed.  Three hernia defects, 1 year umbilicus, to epigastric, within same vicinity consistent with incarcerated ventral hernias.  Patient now desires herniorrhaphy.  Pain associated with lifting heavy things.  Bulge increasing size.  She now presents today as established follow-up visit and scheduling surgery    Past Medical History:   Diagnosis Date    Anemia     Herpes simplex virus (HSV) infection     Hypertension      Past Surgical History:   Procedure Laterality Date    DILATION AND CURETTAGE OF UTERUS       Family History   Problem Relation Age of Onset    Hyperlipidemia Sister      Social History     Socioeconomic History    Marital status: Single     Spouse name: Not on file    Number of children: Not on file    Years of  education: Not on file    Highest education level: Not on file   Occupational History    Not on file   Social Needs    Financial resource strain: Not on file    Food insecurity:     Worry: Not on file     Inability: Not on file    Transportation needs:     Medical: Not on file     Non-medical: Not on file   Tobacco Use    Smoking status: Never Smoker    Smokeless tobacco: Never Used   Substance and Sexual Activity    Alcohol use: No     Frequency: Never    Drug use: No    Sexual activity: Yes     Partners: Male     Birth control/protection: None   Lifestyle    Physical activity:     Days per week: Not on file     Minutes per session: Not on file    Stress: Not on file   Relationships    Social connections:     Talks on phone: Not on file     Gets together: Not on file     Attends Mandaen service: Not on file     Active member of club or organization: Not on file     Attends meetings of clubs or organizations: Not on file     Relationship status: Not on file   Other Topics Concern    Not on file   Social History Narrative    Not on file       Current Outpatient Medications   Medication Sig Dispense Refill    amLODIPine (NORVASC) 5 MG tablet Take 1 tablet (5 mg total) by mouth once daily. 30 tablet 2     Current Facility-Administered Medications   Medication Dose Route Frequency Provider Last Rate Last Dose    lidocaine (PF) 10 mg/ml (1%) injection 10 mg  1 mL Intradermal Once Amadou Chambers MD        lidocaine (PF) 10 mg/ml (1%) injection 10 mg  1 mL Intradermal Once Amadou Chambers MD         Review of patient's allergies indicates:  No Known Allergies    Review of Systems   Constitutional: Negative for activity change, appetite change, chills and fever.   HENT: Negative for congestion, dental problem and ear discharge.    Eyes: Negative for discharge and itching.   Respiratory: Negative for apnea, choking and chest tightness.    Cardiovascular: Negative for chest pain and leg  "swelling.   Gastrointestinal: Negative for abdominal distention, abdominal pain, anal bleeding, constipation, diarrhea and nausea.   Endocrine: Negative for cold intolerance and heat intolerance.   Genitourinary: Negative for difficulty urinating and dyspareunia.   Musculoskeletal: Negative for arthralgias and back pain.   Skin: Negative for color change and pallor.   Neurological: Negative for dizziness and facial asymmetry.   Hematological: Negative for adenopathy. Does not bruise/bleed easily.   Psychiatric/Behavioral: Negative for agitation and behavioral problems.       Objective:      Vitals:    01/09/20 0836   BP: (!) 161/94   BP Location: Right arm   Patient Position: Sitting   BP Method: Large (Automatic)   Pulse: 80   Resp: 12   Temp: 98.1 °F (36.7 °C)   TempSrc: Oral   SpO2: 99%   Weight: 75 kg (165 lb 5.5 oz)   Height: 5' 5" (1.651 m)     Physical Exam   Constitutional: She is oriented to person, place, and time. She appears well-developed and well-nourished. No distress.   HENT:   Head: Normocephalic and atraumatic.   Eyes: Pupils are equal, round, and reactive to light. EOM are normal.   Neck: Normal range of motion. Neck supple. No thyromegaly present.   Cardiovascular: Normal rate and regular rhythm.   Pulmonary/Chest: Effort normal and breath sounds normal.   Abdominal: Soft. Bowel sounds are normal. She exhibits no distension. There is no tenderness. A hernia is present.       Musculoskeletal: Normal range of motion. She exhibits no edema or deformity.   Neurological: She is alert and oriented to person, place, and time. No cranial nerve deficit.   Skin: Skin is warm. Capillary refill takes less than 2 seconds. No erythema.   Psychiatric: She has a normal mood and affect. Her behavior is normal.       Lab Review:   CBC:   Lab Results   Component Value Date    WBC 13.79 (H) 09/08/2018    RBC 3.87 (L) 09/08/2018    HGB 10.9 (L) 09/08/2018    HCT 33.0 (L) 09/08/2018     09/08/2018     BMP: "   Lab Results   Component Value Date    GLU 90 09/06/2018     (L) 09/06/2018    K 3.9 09/06/2018     09/06/2018    CO2 21 (L) 09/06/2018    BUN 7 09/06/2018    CREATININE 0.6 09/06/2018    CALCIUM 8.8 09/06/2018     Diagnostics Review: CT scan ultrasound MRI reviewed.  MRI shows hemangioma of the liver. Benign.  Imaging test confirmed ventral hernia.  There appears to be 3 small defects.  Fat incarcerated.     Assessment:       1. Incarcerated ventral hernia        Plan:   Incarcerated ventral hernia  -     Case Request Operating Room: REPAIR, HERNIA, VENTRAL, INCARCERATED, WITHOUT HISTORY OF PRIOR REPAIR  -     Basic metabolic panel; Future; Expected date: 01/09/2020  -     CBC auto differential; Future; Expected date: 01/09/2020    Other orders  -     Progressive Mobility Protocol (mobilize patient to their highest level of functioning at least twice daily); Standing  -     Insert peripheral IV; Standing  -     lidocaine (PF) 10 mg/ml (1%) injection 10 mg  -     Full code; Standing        Medical Decision Making/Counseling:  Patient with 3 defects, ventral hernia with incarcerated fat.  Patient desires surgical herniorrhaphy.  Risk and benefits of surgical herniorrhaphy were discussed in detail in clinic today.  Patient states that she has pain and pressure associated with ventral hernias.  I discussed with the patient that I am unsure of the best way to repair this, whether with suture repair or underlay mesh.  I discussed with the patient that if each individual defect is small, amenable to suture repair then we will perform that however if each defect attenuated with fascia and close together, best solution would be for opening of each hernia making 1 muscular defect and an underlay of mesh.  Patient in agreement with plan.  Wishes for herniorrhaphy.  Risk and benefits of mesh placement, mesh infection, wound infection, hernia recurrence, injury to surrounding structures to include the bowel, EC  fistula formation, etc all discussed in clinic today.  Patient understands the risk and benefits of surgical intervention wishes to proceed with surgical herniorrhaphy in the near future.    Total clinic time today with patient, in face-to-face interaction was 45 min, of which greater than half of that time was spent in face-to-face counseling.

## 2020-01-09 NOTE — H&P (VIEW-ONLY)
[facility] H&P Note    Subjective:       Patient ID: Linda Hart is a 34 y.o. female.    Chief Complaint: Consult (Schedule hernia repair)    HPI:  03/2019  Linda Hart is a 34 y.o. female with a history of anemia, htn presents today as an established patient for follow-up of ventral hernias.  Patient has undergone a battery of tests.  Ultrasound confirmed hernia formation however also saw liver lesion.  CT A/P Liver protocol couldn't completely r/o neoplastic lesion lesions and recommended follow-up MRI.  MRI showed hemangioma.  Patient now desires hernia repair.  She has a ventral hernia with 3 components superior to her umbilicus which is incarcerated with pre-peritoneal fat which causes pain associated with her recent pregnancy.  No nausea or vomiting.  No changes in bowel habits.  Patient desires surgical repair of her hernia given the pain.pressure it causes.  Pain described as a 6/10.    1/9/2020  Patient previously scheduled for ventral hernia repair however failed to show up. She canceled hernia repair.  Since patient since last visit nearly 9 months ago has had increasing size increasing pain associated with her ventral hernia. Previous CT scan reviewed.  Three hernia defects, 1 year umbilicus, to epigastric, within same vicinity consistent with incarcerated ventral hernias.  Patient now desires herniorrhaphy.  Pain associated with lifting heavy things.  Bulge increasing size.  She now presents today as established follow-up visit and scheduling surgery    Past Medical History:   Diagnosis Date    Anemia     Herpes simplex virus (HSV) infection     Hypertension      Past Surgical History:   Procedure Laterality Date    DILATION AND CURETTAGE OF UTERUS       Family History   Problem Relation Age of Onset    Hyperlipidemia Sister      Social History     Socioeconomic History    Marital status: Single     Spouse name: Not on file    Number of children: Not on file    Years of  education: Not on file    Highest education level: Not on file   Occupational History    Not on file   Social Needs    Financial resource strain: Not on file    Food insecurity:     Worry: Not on file     Inability: Not on file    Transportation needs:     Medical: Not on file     Non-medical: Not on file   Tobacco Use    Smoking status: Never Smoker    Smokeless tobacco: Never Used   Substance and Sexual Activity    Alcohol use: No     Frequency: Never    Drug use: No    Sexual activity: Yes     Partners: Male     Birth control/protection: None   Lifestyle    Physical activity:     Days per week: Not on file     Minutes per session: Not on file    Stress: Not on file   Relationships    Social connections:     Talks on phone: Not on file     Gets together: Not on file     Attends Baptism service: Not on file     Active member of club or organization: Not on file     Attends meetings of clubs or organizations: Not on file     Relationship status: Not on file   Other Topics Concern    Not on file   Social History Narrative    Not on file       Current Outpatient Medications   Medication Sig Dispense Refill    amLODIPine (NORVASC) 5 MG tablet Take 1 tablet (5 mg total) by mouth once daily. 30 tablet 2     Current Facility-Administered Medications   Medication Dose Route Frequency Provider Last Rate Last Dose    lidocaine (PF) 10 mg/ml (1%) injection 10 mg  1 mL Intradermal Once Amadou Chambers MD        lidocaine (PF) 10 mg/ml (1%) injection 10 mg  1 mL Intradermal Once Amadou Chambers MD         Review of patient's allergies indicates:  No Known Allergies    Review of Systems   Constitutional: Negative for activity change, appetite change, chills and fever.   HENT: Negative for congestion, dental problem and ear discharge.    Eyes: Negative for discharge and itching.   Respiratory: Negative for apnea, choking and chest tightness.    Cardiovascular: Negative for chest pain and leg  "swelling.   Gastrointestinal: Negative for abdominal distention, abdominal pain, anal bleeding, constipation, diarrhea and nausea.   Endocrine: Negative for cold intolerance and heat intolerance.   Genitourinary: Negative for difficulty urinating and dyspareunia.   Musculoskeletal: Negative for arthralgias and back pain.   Skin: Negative for color change and pallor.   Neurological: Negative for dizziness and facial asymmetry.   Hematological: Negative for adenopathy. Does not bruise/bleed easily.   Psychiatric/Behavioral: Negative for agitation and behavioral problems.       Objective:      Vitals:    01/09/20 0836   BP: (!) 161/94   BP Location: Right arm   Patient Position: Sitting   BP Method: Large (Automatic)   Pulse: 80   Resp: 12   Temp: 98.1 °F (36.7 °C)   TempSrc: Oral   SpO2: 99%   Weight: 75 kg (165 lb 5.5 oz)   Height: 5' 5" (1.651 m)     Physical Exam   Constitutional: She is oriented to person, place, and time. She appears well-developed and well-nourished. No distress.   HENT:   Head: Normocephalic and atraumatic.   Eyes: Pupils are equal, round, and reactive to light. EOM are normal.   Neck: Normal range of motion. Neck supple. No thyromegaly present.   Cardiovascular: Normal rate and regular rhythm.   Pulmonary/Chest: Effort normal and breath sounds normal.   Abdominal: Soft. Bowel sounds are normal. She exhibits no distension. There is no tenderness. A hernia is present.       Musculoskeletal: Normal range of motion. She exhibits no edema or deformity.   Neurological: She is alert and oriented to person, place, and time. No cranial nerve deficit.   Skin: Skin is warm. Capillary refill takes less than 2 seconds. No erythema.   Psychiatric: She has a normal mood and affect. Her behavior is normal.       Lab Review:   CBC:   Lab Results   Component Value Date    WBC 13.79 (H) 09/08/2018    RBC 3.87 (L) 09/08/2018    HGB 10.9 (L) 09/08/2018    HCT 33.0 (L) 09/08/2018     09/08/2018     BMP: "   Lab Results   Component Value Date    GLU 90 09/06/2018     (L) 09/06/2018    K 3.9 09/06/2018     09/06/2018    CO2 21 (L) 09/06/2018    BUN 7 09/06/2018    CREATININE 0.6 09/06/2018    CALCIUM 8.8 09/06/2018     Diagnostics Review: CT scan ultrasound MRI reviewed.  MRI shows hemangioma of the liver. Benign.  Imaging test confirmed ventral hernia.  There appears to be 3 small defects.  Fat incarcerated.     Assessment:       1. Incarcerated ventral hernia        Plan:   Incarcerated ventral hernia  -     Case Request Operating Room: REPAIR, HERNIA, VENTRAL, INCARCERATED, WITHOUT HISTORY OF PRIOR REPAIR  -     Basic metabolic panel; Future; Expected date: 01/09/2020  -     CBC auto differential; Future; Expected date: 01/09/2020    Other orders  -     Progressive Mobility Protocol (mobilize patient to their highest level of functioning at least twice daily); Standing  -     Insert peripheral IV; Standing  -     lidocaine (PF) 10 mg/ml (1%) injection 10 mg  -     Full code; Standing        Medical Decision Making/Counseling:  Patient with 3 defects, ventral hernia with incarcerated fat.  Patient desires surgical herniorrhaphy.  Risk and benefits of surgical herniorrhaphy were discussed in detail in clinic today.  Patient states that she has pain and pressure associated with ventral hernias.  I discussed with the patient that I am unsure of the best way to repair this, whether with suture repair or underlay mesh.  I discussed with the patient that if each individual defect is small, amenable to suture repair then we will perform that however if each defect attenuated with fascia and close together, best solution would be for opening of each hernia making 1 muscular defect and an underlay of mesh.  Patient in agreement with plan.  Wishes for herniorrhaphy.  Risk and benefits of mesh placement, mesh infection, wound infection, hernia recurrence, injury to surrounding structures to include the bowel, EC  fistula formation, etc all discussed in clinic today.  Patient understands the risk and benefits of surgical intervention wishes to proceed with surgical herniorrhaphy in the near future.    Total clinic time today with patient, in face-to-face interaction was 45 min, of which greater than half of that time was spent in face-to-face counseling.

## 2020-01-31 ENCOUNTER — LAB VISIT (OUTPATIENT)
Dept: LAB | Facility: HOSPITAL | Age: 35
End: 2020-01-31
Attending: SURGERY
Payer: COMMERCIAL

## 2020-01-31 DIAGNOSIS — K43.6 INCARCERATED VENTRAL HERNIA: ICD-10-CM

## 2020-01-31 LAB
ANION GAP SERPL CALC-SCNC: 7 MMOL/L (ref 8–16)
BASOPHILS # BLD AUTO: 0.04 K/UL (ref 0–0.2)
BASOPHILS NFR BLD: 0.7 % (ref 0–1.9)
BUN SERPL-MCNC: 13 MG/DL (ref 6–20)
CALCIUM SERPL-MCNC: 8.7 MG/DL (ref 8.7–10.5)
CHLORIDE SERPL-SCNC: 106 MMOL/L (ref 95–110)
CO2 SERPL-SCNC: 27 MMOL/L (ref 23–29)
CREAT SERPL-MCNC: 1.1 MG/DL (ref 0.5–1.4)
DIFFERENTIAL METHOD: NORMAL
EOSINOPHIL # BLD AUTO: 0 K/UL (ref 0–0.5)
EOSINOPHIL NFR BLD: 0.7 % (ref 0–8)
ERYTHROCYTE [DISTWIDTH] IN BLOOD BY AUTOMATED COUNT: 13.2 % (ref 11.5–14.5)
EST. GFR  (AFRICAN AMERICAN): >60 ML/MIN/1.73 M^2
EST. GFR  (NON AFRICAN AMERICAN): >60 ML/MIN/1.73 M^2
GLUCOSE SERPL-MCNC: 89 MG/DL (ref 70–110)
HCT VFR BLD AUTO: 39.3 % (ref 37–48.5)
HGB BLD-MCNC: 12.9 G/DL (ref 12–16)
IMM GRANULOCYTES # BLD AUTO: 0.01 K/UL (ref 0–0.04)
IMM GRANULOCYTES NFR BLD AUTO: 0.2 % (ref 0–0.5)
LYMPHOCYTES # BLD AUTO: 1.9 K/UL (ref 1–4.8)
LYMPHOCYTES NFR BLD: 34.6 % (ref 18–48)
MCH RBC QN AUTO: 28.6 PG (ref 27–31)
MCHC RBC AUTO-ENTMCNC: 32.8 G/DL (ref 32–36)
MCV RBC AUTO: 87 FL (ref 82–98)
MONOCYTES # BLD AUTO: 0.4 K/UL (ref 0.3–1)
MONOCYTES NFR BLD: 6.6 % (ref 4–15)
NEUTROPHILS # BLD AUTO: 3.2 K/UL (ref 1.8–7.7)
NEUTROPHILS NFR BLD: 57.2 % (ref 38–73)
NRBC BLD-RTO: 0 /100 WBC
PLATELET # BLD AUTO: 261 K/UL (ref 150–350)
PMV BLD AUTO: 11 FL (ref 9.2–12.9)
POTASSIUM SERPL-SCNC: 3.7 MMOL/L (ref 3.5–5.1)
RBC # BLD AUTO: 4.51 M/UL (ref 4–5.4)
SODIUM SERPL-SCNC: 140 MMOL/L (ref 136–145)
WBC # BLD AUTO: 5.58 K/UL (ref 3.9–12.7)

## 2020-01-31 PROCEDURE — 80048 BASIC METABOLIC PNL TOTAL CA: CPT

## 2020-01-31 PROCEDURE — 85025 COMPLETE CBC W/AUTO DIFF WBC: CPT

## 2020-01-31 PROCEDURE — 36415 COLL VENOUS BLD VENIPUNCTURE: CPT

## 2020-02-04 ENCOUNTER — HOSPITAL ENCOUNTER (OUTPATIENT)
Facility: HOSPITAL | Age: 35
Discharge: HOME | End: 2020-02-04
Attending: SURGERY | Admitting: SURGERY
Payer: MEDICAID

## 2020-02-04 ENCOUNTER — ANESTHESIA EVENT (OUTPATIENT)
Dept: SURGERY | Facility: HOSPITAL | Age: 35
End: 2020-02-04
Payer: COMMERCIAL

## 2020-02-04 ENCOUNTER — ANESTHESIA (OUTPATIENT)
Dept: SURGERY | Facility: HOSPITAL | Age: 35
End: 2020-02-04
Payer: COMMERCIAL

## 2020-02-04 VITALS
DIASTOLIC BLOOD PRESSURE: 100 MMHG | HEART RATE: 58 BPM | HEIGHT: 65 IN | BODY MASS INDEX: 27.82 KG/M2 | OXYGEN SATURATION: 97 % | TEMPERATURE: 98 F | SYSTOLIC BLOOD PRESSURE: 155 MMHG | WEIGHT: 167 LBS | RESPIRATION RATE: 13 BRPM

## 2020-02-04 DIAGNOSIS — K43.6 INCARCERATED VENTRAL HERNIA: ICD-10-CM

## 2020-02-04 LAB — B-HCG UR QL: NEGATIVE

## 2020-02-04 PROCEDURE — D9220A PRA ANESTHESIA: Mod: ANES,,, | Performed by: ANESTHESIOLOGY

## 2020-02-04 PROCEDURE — C9290 INJ, BUPIVACAINE LIPOSOME: HCPCS | Performed by: SURGERY

## 2020-02-04 PROCEDURE — C1781 MESH (IMPLANTABLE): HCPCS | Performed by: SURGERY

## 2020-02-04 PROCEDURE — 88302 PR  SURG PATH,LEVEL II: ICD-10-PCS | Mod: 26,,, | Performed by: PATHOLOGY

## 2020-02-04 PROCEDURE — 49568 PR IMPLANT MESH HERNIA REPAIR/DEBRIDEMENT CLOSURE: ICD-10-PCS | Mod: ,,, | Performed by: SURGERY

## 2020-02-04 PROCEDURE — 25000003 PHARM REV CODE 250: Performed by: SURGERY

## 2020-02-04 PROCEDURE — D9220A PRA ANESTHESIA: ICD-10-PCS | Mod: ANES,,, | Performed by: ANESTHESIOLOGY

## 2020-02-04 PROCEDURE — 37000008 HC ANESTHESIA 1ST 15 MINUTES: Performed by: SURGERY

## 2020-02-04 PROCEDURE — 37000009 HC ANESTHESIA EA ADD 15 MINS: Performed by: SURGERY

## 2020-02-04 PROCEDURE — 71000033 HC RECOVERY, INTIAL HOUR: Performed by: SURGERY

## 2020-02-04 PROCEDURE — 49560 PR REPAIR INCISIONAL HERNIA,REDUCIBLE: CPT | Mod: ,,, | Performed by: SURGERY

## 2020-02-04 PROCEDURE — 36000707: Performed by: SURGERY

## 2020-02-04 PROCEDURE — 25000003 PHARM REV CODE 250: Performed by: NURSE ANESTHETIST, CERTIFIED REGISTERED

## 2020-02-04 PROCEDURE — 63600175 PHARM REV CODE 636 W HCPCS: Performed by: SURGERY

## 2020-02-04 PROCEDURE — 81025 URINE PREGNANCY TEST: CPT

## 2020-02-04 PROCEDURE — 88302 TISSUE EXAM BY PATHOLOGIST: CPT | Performed by: PATHOLOGY

## 2020-02-04 PROCEDURE — 63600175 PHARM REV CODE 636 W HCPCS: Performed by: ANESTHESIOLOGY

## 2020-02-04 PROCEDURE — 63600175 PHARM REV CODE 636 W HCPCS: Performed by: NURSE ANESTHETIST, CERTIFIED REGISTERED

## 2020-02-04 PROCEDURE — 25000003 PHARM REV CODE 250: Performed by: ANESTHESIOLOGY

## 2020-02-04 PROCEDURE — 36000706: Performed by: SURGERY

## 2020-02-04 PROCEDURE — 49560 PR REPAIR INCISIONAL HERNIA,REDUCIBLE: ICD-10-PCS | Mod: ,,, | Performed by: SURGERY

## 2020-02-04 PROCEDURE — 49568 PR IMPLANT MESH HERNIA REPAIR/DEBRIDEMENT CLOSURE: CPT | Mod: ,,, | Performed by: SURGERY

## 2020-02-04 PROCEDURE — 88302 TISSUE EXAM BY PATHOLOGIST: CPT | Mod: 26,,, | Performed by: PATHOLOGY

## 2020-02-04 DEVICE — COMPOSITE MESH MONOFILAMENT POLYPROPYLENE MESH WITH ABSORBABLE SYNTHETIC FILM AND MARKING
Type: IMPLANTABLE DEVICE | Site: ABDOMEN | Status: FUNCTIONAL
Brand: PARIETENE DS

## 2020-02-04 RX ORDER — ONDANSETRON 2 MG/ML
INJECTION INTRAMUSCULAR; INTRAVENOUS
Status: DISCONTINUED | OUTPATIENT
Start: 2020-02-04 | End: 2020-02-04

## 2020-02-04 RX ORDER — SODIUM CHLORIDE 9 MG/ML
INJECTION, SOLUTION INTRAVENOUS CONTINUOUS
Status: DISCONTINUED | OUTPATIENT
Start: 2020-02-04 | End: 2020-02-04 | Stop reason: HOSPADM

## 2020-02-04 RX ORDER — MIDAZOLAM HYDROCHLORIDE 1 MG/ML
INJECTION, SOLUTION INTRAMUSCULAR; INTRAVENOUS
Status: DISCONTINUED | OUTPATIENT
Start: 2020-02-04 | End: 2020-02-04

## 2020-02-04 RX ORDER — ROCURONIUM BROMIDE 10 MG/ML
INJECTION, SOLUTION INTRAVENOUS
Status: DISCONTINUED | OUTPATIENT
Start: 2020-02-04 | End: 2020-02-04

## 2020-02-04 RX ORDER — BUPIVACAINE HYDROCHLORIDE AND EPINEPHRINE 5; 5 MG/ML; UG/ML
INJECTION, SOLUTION EPIDURAL; INTRACAUDAL; PERINEURAL
Status: DISCONTINUED | OUTPATIENT
Start: 2020-02-04 | End: 2020-02-04 | Stop reason: HOSPADM

## 2020-02-04 RX ORDER — SODIUM CHLORIDE, SODIUM LACTATE, POTASSIUM CHLORIDE, CALCIUM CHLORIDE 600; 310; 30; 20 MG/100ML; MG/100ML; MG/100ML; MG/100ML
125 INJECTION, SOLUTION INTRAVENOUS CONTINUOUS
Status: DISCONTINUED | OUTPATIENT
Start: 2020-02-04 | End: 2020-02-04 | Stop reason: HOSPADM

## 2020-02-04 RX ORDER — SODIUM CHLORIDE, SODIUM LACTATE, POTASSIUM CHLORIDE, CALCIUM CHLORIDE 600; 310; 30; 20 MG/100ML; MG/100ML; MG/100ML; MG/100ML
INJECTION, SOLUTION INTRAVENOUS CONTINUOUS PRN
Status: DISCONTINUED | OUTPATIENT
Start: 2020-02-04 | End: 2020-02-04

## 2020-02-04 RX ORDER — KETOROLAC TROMETHAMINE 30 MG/ML
15 INJECTION, SOLUTION INTRAMUSCULAR; INTRAVENOUS ONCE
Status: DISCONTINUED | OUTPATIENT
Start: 2020-02-04 | End: 2020-02-04 | Stop reason: HOSPADM

## 2020-02-04 RX ORDER — MEPERIDINE HYDROCHLORIDE 50 MG/ML
INJECTION INTRAMUSCULAR; INTRAVENOUS; SUBCUTANEOUS
Status: DISCONTINUED | OUTPATIENT
Start: 2020-02-04 | End: 2020-02-04

## 2020-02-04 RX ORDER — OXYCODONE AND ACETAMINOPHEN 5; 325 MG/1; MG/1
1 TABLET ORAL
Status: DISCONTINUED | OUTPATIENT
Start: 2020-02-04 | End: 2020-02-04 | Stop reason: HOSPADM

## 2020-02-04 RX ORDER — OXYCODONE AND ACETAMINOPHEN 5; 325 MG/1; MG/1
1 TABLET ORAL EVERY 4 HOURS PRN
Qty: 42 TABLET | Refills: 0 | Status: SHIPPED | OUTPATIENT
Start: 2020-02-04 | End: 2020-02-14

## 2020-02-04 RX ORDER — ONDANSETRON 4 MG/1
4 TABLET, FILM COATED ORAL EVERY 6 HOURS PRN
Qty: 30 TABLET | Refills: 0 | Status: SHIPPED | OUTPATIENT
Start: 2020-02-04 | End: 2020-02-14

## 2020-02-04 RX ORDER — ONDANSETRON 2 MG/ML
4 INJECTION INTRAMUSCULAR; INTRAVENOUS DAILY PRN
Status: DISCONTINUED | OUTPATIENT
Start: 2020-02-04 | End: 2020-02-04 | Stop reason: HOSPADM

## 2020-02-04 RX ORDER — SUCCINYLCHOLINE CHLORIDE 20 MG/ML
INJECTION INTRAMUSCULAR; INTRAVENOUS
Status: DISCONTINUED | OUTPATIENT
Start: 2020-02-04 | End: 2020-02-04

## 2020-02-04 RX ORDER — CEFAZOLIN SODIUM 2 G/50ML
2 SOLUTION INTRAVENOUS
Status: COMPLETED | OUTPATIENT
Start: 2020-02-04 | End: 2020-02-04

## 2020-02-04 RX ORDER — PROPOFOL 10 MG/ML
VIAL (ML) INTRAVENOUS
Status: DISCONTINUED | OUTPATIENT
Start: 2020-02-04 | End: 2020-02-04

## 2020-02-04 RX ORDER — MORPHINE SULFATE 4 MG/ML
2 INJECTION, SOLUTION INTRAMUSCULAR; INTRAVENOUS EVERY 5 MIN PRN
Status: DISCONTINUED | OUTPATIENT
Start: 2020-02-04 | End: 2020-02-04 | Stop reason: HOSPADM

## 2020-02-04 RX ORDER — CIPROFLOXACIN 500 MG/1
500 TABLET ORAL EVERY 12 HOURS
Qty: 14 TABLET | Refills: 0 | Status: SHIPPED | OUTPATIENT
Start: 2020-02-04 | End: 2020-02-11

## 2020-02-04 RX ORDER — LIDOCAINE HYDROCHLORIDE 10 MG/ML
1 INJECTION, SOLUTION EPIDURAL; INFILTRATION; INTRACAUDAL; PERINEURAL ONCE
Status: CANCELLED | OUTPATIENT
Start: 2020-02-04 | End: 2020-02-04

## 2020-02-04 RX ORDER — DEXAMETHASONE SODIUM PHOSPHATE 4 MG/ML
INJECTION, SOLUTION INTRA-ARTICULAR; INTRALESIONAL; INTRAMUSCULAR; INTRAVENOUS; SOFT TISSUE
Status: DISCONTINUED | OUTPATIENT
Start: 2020-02-04 | End: 2020-02-04

## 2020-02-04 RX ORDER — SODIUM CHLORIDE, SODIUM LACTATE, POTASSIUM CHLORIDE, CALCIUM CHLORIDE 600; 310; 30; 20 MG/100ML; MG/100ML; MG/100ML; MG/100ML
INJECTION, SOLUTION INTRAVENOUS CONTINUOUS
Status: CANCELLED | OUTPATIENT
Start: 2020-02-04

## 2020-02-04 RX ADMIN — MIDAZOLAM HYDROCHLORIDE 2 MG: 1 INJECTION, SOLUTION INTRAMUSCULAR; INTRAVENOUS at 01:02

## 2020-02-04 RX ADMIN — SUCCINYLCHOLINE CHLORIDE 120 MG: 20 INJECTION, SOLUTION INTRAMUSCULAR; INTRAVENOUS at 01:02

## 2020-02-04 RX ADMIN — MORPHINE SULFATE 2 MG: 4 INJECTION, SOLUTION INTRAMUSCULAR; INTRAVENOUS at 04:02

## 2020-02-04 RX ADMIN — ONDANSETRON 4 MG: 2 INJECTION INTRAMUSCULAR; INTRAVENOUS at 01:02

## 2020-02-04 RX ADMIN — ROCURONIUM BROMIDE 30 MG: 10 INJECTION, SOLUTION INTRAVENOUS at 01:02

## 2020-02-04 RX ADMIN — PROPOFOL 200 MG: 10 INJECTION, EMULSION INTRAVENOUS at 01:02

## 2020-02-04 RX ADMIN — SODIUM CHLORIDE: 0.9 INJECTION, SOLUTION INTRAVENOUS at 01:02

## 2020-02-04 RX ADMIN — ROCURONIUM BROMIDE 10 MG: 10 INJECTION, SOLUTION INTRAVENOUS at 02:02

## 2020-02-04 RX ADMIN — OXYCODONE HYDROCHLORIDE AND ACETAMINOPHEN 1 TABLET: 5; 325 TABLET ORAL at 04:02

## 2020-02-04 RX ADMIN — SODIUM CHLORIDE, POTASSIUM CHLORIDE, SODIUM LACTATE AND CALCIUM CHLORIDE: 600; 310; 30; 20 INJECTION, SOLUTION INTRAVENOUS at 01:02

## 2020-02-04 RX ADMIN — CEFAZOLIN SODIUM 2 G: 2 SOLUTION INTRAVENOUS at 01:02

## 2020-02-04 RX ADMIN — MEPERIDINE HYDROCHLORIDE 50 MG: 50 INJECTION INTRAMUSCULAR; INTRAVENOUS; SUBCUTANEOUS at 01:02

## 2020-02-04 RX ADMIN — DEXAMETHASONE SODIUM PHOSPHATE 4 MG: 4 INJECTION, SOLUTION INTRAMUSCULAR; INTRAVENOUS at 01:02

## 2020-02-04 RX ADMIN — SUGAMMADEX 200 MG: 100 INJECTION, SOLUTION INTRAVENOUS at 03:02

## 2020-02-04 NOTE — INTERVAL H&P NOTE
The patient has been examined and the H&P has been reviewed:    I concur with the findings and no changes have occurred since H&P was written.    Surgery risks, benefits and alternative options discussed and understood by patient/family.    Ventral hernia repair with mesh discussed.  Patient voiced understanding wished to proceed today.      Active Hospital Problems    Diagnosis  POA    Incarcerated ventral hernia [K43.6]  Yes      Resolved Hospital Problems   No resolved problems to display.

## 2020-02-04 NOTE — DISCHARGE INSTRUCTIONS
Anesthesia: General Anesthesia     You are watched continuously during your procedure by your anesthesia provider.     Youre due to have surgery. During surgery, youll be given medicine called anesthesia or anesthetic. This will keep you comfortable and pain-free. Your anesthesia provider will use general anesthesia.  What is general anesthesia?  General anesthesia puts you into a state like deep sleep. It goes into the bloodstream (IV anesthetics), into the lungs (gas anesthetics), or both. You feel nothing during the procedure. You will not remember it. During the procedure, the anesthesia provider monitors you continuously. He or she checks your heart rate and rhythm, blood pressure, breathing, and blood oxygen.  · IV anesthetics. IV anesthetics are given through an IV line in your arm. Theyre often given first. This is so you are asleep before a gas anesthetic is started. Some kinds of IV anesthetics relieve pain. Others relax you. Your doctor will decide which kind is best in your case.  · Gas anesthetics. Gas anesthetics are breathed into the lungs. They are often used to keep you asleep. They can be given through a facemask or a tube placed in your larynx or trachea (breathing tube).  ¨ If you have a facemask, your anesthesia provider will most likely place it over your nose and mouth while youre still awake. Youll breathe oxygen through the mask as your IV anesthetic is started. Gas anesthetic may be added through the mask.  ¨ If you have a tube in the larynx or trachea, it will be inserted into your throat after youre asleep.  Anesthesia tools and medicines  You will likely have:  · IV anesthetics. These are put into an IV line into your bloodstream.  · Gas anesthetics. You breathe these anesthetics into your lungs, where they pass into your bloodstream.  · Pulse oximeter. This is a small clip that is attached to the end of your finger. This measures your blood oxygen level.  · Electrocardiography  leads (electrodes). These are small sticky pads that are placed on your chest. They record your heart rate and rhythm.  · Blood pressure cuff. This reads your blood pressure.  Risks and possible complications  General anesthesia has some risks. These include:  · Breathing problems  · Nausea and vomiting  · Sore throat or hoarseness (usually temporary)  · Allergic reaction to the anesthetic  · Irregular heartbeat (rare)  · Cardiac arrest (rare)   Anesthesia safety  · Follow all instructions you are given for how long not to eat or drink before your procedure.  · Be sure your doctor knows what medicines and drugs you take. This includes over-the-counter medicines, herbs, supplements, alcohol or other drugs. You will be asked when those were last taken.  · Have an adult family member or friend drive you home after the procedure.  · For the first 24 hours after your surgery:  ¨ Do not drive or use heavy equipment.  ¨ Do not make important decisions or sign legal documents. If important decisions or signing legal documents is necessary during the first 24 hours after surgery, have a trusted family member or spouse act on your behalf.  ¨ Avoid alcohol.  ¨ Have a responsible adult stay with you. He or she can watch for problems and help keep you safe.  Date Last Reviewed: 12/1/2016  © 6167-0785 [employer]. [address] [place] [address], [place] [address]. All rights reserved. This information is not intended as a substitute for professional medical care. Always follow your healthcare professional's instructions.

## 2020-02-04 NOTE — ANESTHESIA POSTPROCEDURE EVALUATION
Anesthesia Post Evaluation    Patient: Linda Hart    Procedure(s) Performed: Procedure(s) (LRB):  REPAIR, HERNIA, VENTRAL, INCARCERATED, WITHOUT HISTORY OF PRIOR REPAIR (N/A)    Final Anesthesia Type: general    Patient location during evaluation: PACU  Patient participation: Yes- Able to Participate  Level of consciousness: awake and awake and alert  Post-procedure vital signs: reviewed and stable  Pain management: adequate  Airway patency: patent    PONV status at discharge: No PONV  Anesthetic complications: no      Cardiovascular status: blood pressure returned to baseline  Respiratory status: unassisted and spontaneous ventilation  Hydration status: euvolemic  Follow-up not needed.          Vitals Value Taken Time   /95 2/4/2020  3:46 PM   Temp 36.6 °C (97.8 °F) 2/4/2020  3:20 PM   Pulse 63 2/4/2020  3:48 PM   Resp 16 2/4/2020  3:48 PM   SpO2 100 % 2/4/2020  3:48 PM   Vitals shown include unvalidated device data.      No case tracking events are documented in the log.      Pain/Kristen Score: Kristen Score: 9 (2/4/2020  3:45 PM)

## 2020-02-04 NOTE — OP NOTE
[facility] - Periop Services  Operative Note     SUMMARY     Surgery Date: 2/4/2020     Pre-op Diagnosis:  Incarcerated ventral hernia [K43.6]    Post-op Diagnosis:  Post-Op Diagnosis Codes:     * Incarcerated ventral hernia [K43.6]    Operation:  Incarcerated ventral hernia repair.  Placement of sublay intra-abdominal synthetic mesh for utilization with hernia repair.    Modifier 22:  This case was significantly more difficult than other cases of like CPT code.  There were 3 hernia defects.  Three incarcerated ventral hernias.  This required increased dissection time.  Three hernias defects noted.  These were opened into one hernia sac.  Therefore I am adding the 22 modifier to this case.    Surgeon(s) and Role:     * Amadou Chambers MD - Primary    Assistant: Pierce    Antibiotics: Ancef 2 grams IV    Estimated Blood Loss: 5cc    Anesthesia:  General    Description of the findings of the procedure:  Fat containing incarcerated ventral hernias x3.  Sublay mesh placed.  Hernia defect 7x5cms.    Complications:  None apparent in the OR.    Specimens:  Hernia sacs and contents.    Implants:Parietene DS 10cm x 8cm duel layer sublay mesh         Indications for Procedure:     Ms Hart is a 33yo F who presented to and was seen in the surgery Clinic by me for evaluation of ventral hernias.  Patient underwent CT scan which showed evidence of 3 ventral hernia defects.  Fat containing.  Chronically incarcerated.  Patient desired for herniorrhaphy.  Risk and benefits of ventral herniorrhaphy were discussed in detail in clinic.  Utilization of mesh was discussed.  Laparoscopic as well as open options were discussed.  Given the chronically incarcerated nature of these ventral hernias as well as the size, decision was made for open ventral hernia repair.  After informed discussion in clinic, patient voiced understanding of the risk benefits wished to proceed today by signing informed  consent.    Procedure:     Patient was seen in preoperative holding.  Risk and benefits of ventral hernia repair were discussed.  Utilization of mesh was discussed.  Patient voiced understanding, and agreement and wished to proceed today by signing informed consent.  She was brought back into the operative room placed on the table in the supine position.  General anesthesia was given via an endotracheal tube per Anesthesia. Please see separate dictated anesthesia note for more detail regarding anesthesia.  Patient's abdomen was then prepped and draped in the standard sterile surgical fashion. 2 g IV Ancef was given prior surgical incision.    10 cc 0.5% Marcaine with epinephrine was then injected in the local surgical site of planned incision.  Skin incision was made then in a vertical fashion around the umbilicus on the left side. Skin incision was carried down to hernia sacs and fascia with Bovie electric cautery.  There were 3 noted hernia sacs.  Two epigastric.  One periumbilical.  There was approximately 2 cm between each hernia sac. Each hernia had incarcerated preperitoneal fat.  Hernia sacs and contents were resected off the fascia. These were handed off as specimen.  Decision at this point was to either primarily close each hernia sac individually or open each hernia sac into 1 large hernia.  Given the close proximity of the hernia sacs, increased risk of recurrence, and the very attenuated fascia in between the hernia sacs, decision was made to open each hernia sac into 1 large defect vertically.  This was done.  Hernia defect was noted to be 7 cm long by 5 cm wide.  Fascia was then free from surrounding soft tissues.  Decision this point was to use a piece of synthetic polypropylene duel layer mesh.  Parietene DS was chosen.  It was trimmed to 10cms long by 8cms wide.    Numerous 0 Ethibond sutures were used in a U-stitch fashion to affix the piece of synthetic mesh to the posterior side of the anterior  abdominal wall fascia in a sublay technique.  After all sutures were tied down, there was no evidence of holes between the mesh and fascia.  Anterior fascia was then reapproximated over top the mesh with 0 Ethibond sutures.  20 cc of Exparel were then injected into the surrounding fascia for postoperative pain control.    Skin soft tissue was then washed out with  irrigant.  Three 0 Vicryl sutures were used in a simple interrupted fashion close the layers of fat as well as the subdermal layer.  For O Vicryl suture was used in a running subcuticular fashion close the epidermis.  Dermabond was placed over top as a dressing.  Patient tolerated procedure well.  She was reversed from anesthesia.  Extubated in the OR.  Transfer to postoperative care unit stable condition.    All counts were correct at the end of the procedure including lap pads, instruments, and needles.  Plan will be discharged home today with lifting precautions, follow up in surgery Clinic in 2 weeks.    This dictation was done with M*Modal.  This is the preferred dictation system of [facility].  With this dictation system, there are grammatical errors as well as spelling errors which are innate to the system.

## 2020-02-04 NOTE — PLAN OF CARE
patient arrived via stretcher from OR. V/S stable with no signs of distress. (20G lt wrist) IV clean, dry and intact. Bed in lowest position, brakes locked and side rails up X2. Will continue to monitor.

## 2020-02-04 NOTE — DISCHARGE SUMMARY
Discharge Note        SUMMARY     Admit Date: 2/4/2020    Attending Physician: Amadou Chambers MD     Discharge Physician: Amadou Chambers MD    Discharge Date: 2/4/2020 3:06 PM      Hospital Course: Patient tolerated procedure well.     Disposition: Home or Self Care    Patient Instructions:   Current Discharge Medication List      START taking these medications    Details   ciprofloxacin HCl (CIPRO) 500 MG tablet Take 1 tablet (500 mg total) by mouth every 12 (twelve) hours. for 7 days  Qty: 14 tablet, Refills: 0      ondansetron (ZOFRAN) 4 MG tablet Take 1 tablet (4 mg total) by mouth every 6 (six) hours as needed for Nausea.  Qty: 30 tablet, Refills: 0      oxyCODONE-acetaminophen (PERCOCET) 5-325 mg per tablet Take 1 tablet by mouth every 4 (four) hours as needed for Pain.  Qty: 42 tablet, Refills: 0    Comments: n/a          CONTINUE these medications which have NOT CHANGED    Details   amLODIPine (NORVASC) 5 MG tablet Take 1 tablet (5 mg total) by mouth once daily.  Qty: 30 tablet, Refills: 2    Associated Diagnoses: Essential hypertension             Discharge Procedure Orders (must include Diet, Follow-up, Activity):   Discharge Procedure Orders (must include Diet, Follow-up, Activity)   Diet general     Call MD for:  temperature >100.4     Call MD for:  persistent nausea and vomiting     Call MD for:  severe uncontrolled pain     Call MD for:  difficulty breathing, headache or visual disturbances     Call MD for:  redness, tenderness, or signs of infection (pain, swelling, redness, odor or green/yellow discharge around incision site)     Call MD for:  persistent dizziness or light-headedness     Lifting restrictions   Order Comments: No heavy lifting, pushing, pulling, or strenuous exercise greater than 10 lb for the next 6 weeks.     Other restrictions (specify):   Order Comments: Showers are okay.  No tub baths or swimming in lakes, jess, streams, or oceans.  No submerging of wound.         Follow Up:  Follow up as scheduled.  Resume routine diet.  Activity as tolerated.    No driving day of procedure.

## 2020-02-04 NOTE — TRANSFER OF CARE
"Anesthesia Transfer of Care Note    Patient: Linda Hart    Procedure(s) Performed: Procedure(s) (LRB):  REPAIR, HERNIA, VENTRAL, INCARCERATED, WITHOUT HISTORY OF PRIOR REPAIR (N/A)    Patient location: PACU    Anesthesia Type: general    Transport from OR: Transported from OR on room air with adequate spontaneous ventilation    Post pain: adequate analgesia    Post assessment: no apparent anesthetic complications and tolerated procedure well    Post vital signs: stable    Level of consciousness: awake, alert and oriented    Nausea/Vomiting: no nausea/vomiting    Complications: none    Transfer of care protocol was followed      Last vitals:   Visit Vitals  BP (!) 144/91   Pulse 68   Temp 36.7 °C (98.1 °F) (Oral)   Resp 16   Ht 5' 5" (1.651 m)   Wt 75.8 kg (167 lb)   SpO2 98%   Breastfeeding? No   BMI 27.79 kg/m²     "

## 2020-02-04 NOTE — ANESTHESIA PREPROCEDURE EVALUATION
02/04/2020  Linda Hart is a 34 y.o., female.    Pre-op Assessment    I have reviewed the Patient Summary Reports.    I have reviewed the Nursing Notes.   I have reviewed the Medications.     Review of Systems  Anesthesia Hx:  No problems with previous Anesthesia  Neg history of prior surgery. Denies Family Hx of Anesthesia complications.   Denies Personal Hx of Anesthesia complications.   Social:  Non-Smoker    Hematology/Oncology:  Hematology Normal   Oncology Normal     EENT/Dental:EENT/Dental Normal   Cardiovascular:   Hypertension, well controlled    Pulmonary:  Pulmonary Normal    Renal/:  Renal/ Normal     Hepatic/GI:  Hepatic/GI Normal    Musculoskeletal:  Musculoskeletal Normal    Neurological:  Neurology Normal    Endocrine:  Endocrine Normal    Dermatological:  Skin Normal    Psych:  Psychiatric Normal           Physical Exam  General:  Well nourished    Airway/Jaw/Neck:  Airway Findings: Mouth Opening: Normal Tongue: Normal  General Airway Assessment: Adult  Mallampati: I  TM Distance: 4 - 6 cm        Eyes/Ears/Nose:  EYES/EARS/NOSE FINDINGS: Normal   Dental:  DENTAL FINDINGS: Normal   Chest/Lungs:  Chest/Lungs Clear    Heart/Vascular:  Heart Findings: Normal Heart murmur: negative Vascular Findings: Normal    Abdomen:  Abdomen Findings: Normal    Musculoskeletal:  Musculoskeletal Findings: Normal   Skin:  Skin Findings: Normal    Mental Status:  Mental Status Findings: Normal        Anesthesia Plan  Type of Anesthesia, risks & benefits discussed:  Anesthesia Type:  general  Patient's Preference:   Intra-op Monitoring Plan: standard ASA monitors  Intra-op Monitoring Plan Comments:   Post Op Pain Control Plan:   Post Op Pain Control Plan Comments:   Induction:   IV  Beta Blocker:  Patient is not currently on a Beta-Blocker (No further documentation required).       Informed Consent: Patient  understands risks and agrees with Anesthesia plan.  Questions answered. Anesthesia consent signed with patient.  ASA Score: 2     Day of Surgery Review of History & Physical:    H&P update referred to the provider.

## 2020-02-06 LAB
FINAL PATHOLOGIC DIAGNOSIS: NORMAL
GROSS: NORMAL

## 2020-02-20 ENCOUNTER — OFFICE VISIT (OUTPATIENT)
Dept: SURGERY | Facility: CLINIC | Age: 35
End: 2020-02-20
Payer: COMMERCIAL

## 2020-02-20 VITALS
OXYGEN SATURATION: 99 % | HEIGHT: 65 IN | WEIGHT: 160.63 LBS | DIASTOLIC BLOOD PRESSURE: 90 MMHG | SYSTOLIC BLOOD PRESSURE: 142 MMHG | RESPIRATION RATE: 11 BRPM | BODY MASS INDEX: 26.76 KG/M2 | TEMPERATURE: 98 F | HEART RATE: 78 BPM

## 2020-02-20 DIAGNOSIS — Z09 POSTOP CHECK: Primary | ICD-10-CM

## 2020-02-20 PROCEDURE — 99024 PR POST-OP FOLLOW-UP VISIT: ICD-10-PCS | Mod: S$GLB,,, | Performed by: SURGERY

## 2020-02-20 PROCEDURE — 99024 POSTOP FOLLOW-UP VISIT: CPT | Mod: S$GLB,,, | Performed by: SURGERY

## 2020-02-20 NOTE — PROGRESS NOTES
"General Surgery  [facility]  Follow-up    HPI/Follow-up exam:  Linda Hart is a 34 y.o. female presents today for follow-up examination after open ventral hernia repair, incarcerated, with mesh.  Patient since surgical intervention has done well. No issues.  No fevers.  Surgical wound completely healed.  No evidence of recurrence of hernia. No erythema induration or fluctuance.  No nausea vomiting.  Tolerating a diet having normal bowel function.  No new issues or complaints.    PHYSICAL EXAM:  BP (!) 142/90 (BP Location: Left arm, Patient Position: Sitting, BP Method: Large (Automatic))   Pulse 78   Temp 98.2 °F (36.8 °C) (Oral)   Resp 11   Ht 5' 5" (1.651 m)   Wt 72.8 kg (160 lb 9.7 oz)   SpO2 99%   BMI 26.73 kg/m²   Gen: Wd Wn female in NAD  Heent: Nc/At, MMM  Cv: RRR  Lung: Non-labored breathing, clear bilaterally  Abd: Soft, non-tender, non-distended, surgical site clean dry intact no signs or symptoms of infection.  No evidence of recurrence of hernia.  Ext: No cyanosis clubbing or edema    Pathology:  Consistent with benign hernia sac.    Assessment:  Linda Hart is a 34 y.o. female s/p incarcerated ventral hernia repair with mesh.    Plan/Medical Decision Making:  Appropriate postoperative recovery thus far.  Continue lifting precautions.  May return to work on March 2nd with light duty, no heavy lifting greater than 10 lb.  Follow-up surgery clinic in 4 weeks.    Followup:  4 weeks.    Patient instructed that best way to communicate with my office staff is for patient to get on the [facility] patient portal to expedite communication and communication issues that may occur.  Patient was given instructions on how to get on the portal.  I encouraged patient to obtain portal access as well.  Ultimately it is up to the patient to obtain access.  Patient voiced understanding.        "

## 2020-02-20 NOTE — LETTER
February 20, 2020      [facility]  [address] [place] [address]  Phone: [phone]  Fax: [phone]       Patient: Linda Hart   YOB: 1985  Date of Visit: 02/20/2020    To Whom It May Concern:    Ward Hart  was at [facility] on 02/20/2020. She may return to work on Monday 03/02/2020 with light duty restrictions, no lifting more than 10 lbs. If you have any questions or concerns, or if I can be of further assistance, please do not hesitate to contact me.    Sincerely,      Amadou Schwartz LPN

## 2020-03-02 ENCOUNTER — TELEPHONE (OUTPATIENT)
Dept: SURGERY | Facility: CLINIC | Age: 35
End: 2020-03-02

## 2020-03-02 NOTE — TELEPHONE ENCOUNTER
----- Message from Mary Ellen Schmitz sent at 3/2/2020  9:44 AM CST -----  Contact: self  Patient is requesting a note for work.  For returning on 03/02/20.  Please fax to [phone]. Patient states mother misplaced her note and asking if it can be sent to her. Please call patient at [phone].  Thanks!

## 2020-03-31 PROBLEM — Z87.59 HISTORY OF PREGNANCY INDUCED HYPERTENSION: Status: ACTIVE | Noted: 2020-03-31

## 2020-03-31 PROBLEM — O10.919 CHRONIC HYPERTENSION AFFECTING PREGNANCY: Status: ACTIVE | Noted: 2020-03-31

## 2020-03-31 PROBLEM — O13.9 PIH (PREGNANCY INDUCED HYPERTENSION): Status: RESOLVED | Noted: 2018-09-06 | Resolved: 2020-03-31

## 2020-04-08 ENCOUNTER — OFFICE VISIT (OUTPATIENT)
Dept: SURGERY | Facility: CLINIC | Age: 35
End: 2020-04-08
Payer: COMMERCIAL

## 2020-04-08 VITALS
TEMPERATURE: 98 F | BODY MASS INDEX: 28.32 KG/M2 | HEIGHT: 65 IN | OXYGEN SATURATION: 98 % | WEIGHT: 170 LBS | SYSTOLIC BLOOD PRESSURE: 135 MMHG | RESPIRATION RATE: 13 BRPM | DIASTOLIC BLOOD PRESSURE: 90 MMHG | HEART RATE: 80 BPM

## 2020-04-08 DIAGNOSIS — Z09 POSTOP CHECK: Primary | ICD-10-CM

## 2020-04-08 PROCEDURE — 99024 POSTOP FOLLOW-UP VISIT: CPT | Mod: S$GLB,,, | Performed by: SURGERY

## 2020-04-08 PROCEDURE — 99024 PR POST-OP FOLLOW-UP VISIT: ICD-10-PCS | Mod: S$GLB,,, | Performed by: SURGERY

## 2020-04-08 NOTE — PROGRESS NOTES
"General Surgery  [facility]  Follow-up    HPI/Follow-up exam:  Linda Hart is a 34 y.o. female presents today for follow-up examination after ventral hernia repair.  No issues since surgical intervention.  No evidence of recurrence of hernia.  Pain has abated.  No nausea vomiting.  Tolerating a diet having normal bowel movements.  Patient found to be pregnant within the last few weeks.  She states she is 12 weeks pregnant now.  Of note the patient had a negative UPT on the day of surgery.    PHYSICAL EXAM:  BP (!) 135/90 (BP Location: Left arm, Patient Position: Sitting, BP Method: Large (Automatic))   Pulse 80   Temp 98 °F (36.7 °C) (Oral)   Resp 13   Ht 5' 5" (1.651 m)   Wt 77.1 kg (169 lb 15.6 oz)   SpO2 98%   BMI 28.29 kg/m²   Gen: Wd Wn female in NAD  Heent: Nc/At, MMM  Cv: RRR  Lung: Non-labored breathing, clear bilaterally  Abd: Soft, non-tender, non-distended, surgical site clean dry intact no signs or symptoms of infection.  Ext: No cyanosis clubbing or edema    Assessment:  Linda Hart is a 34 y.o. female s/p ventral hernia repair.    Plan/Medical Decision Making:  Doing well.  No apparent postsurgical issues.  No evidence of recurrence of hernia.  Return to normal activities.    Followup:  Return to normal activities.  Follow-up as needed.    Patient instructed that best way to communicate with my office staff is for patient to get on the [facility] patient portal to expedite communication and communication issues that may occur.  Patient was given instructions on how to get on the portal.  I encouraged patient to obtain portal access as well.  Ultimately it is up to the patient to obtain access.  Patient voiced understanding.        "

## 2020-04-28 PROBLEM — O09.899 RUBELLA NON-IMMUNE STATUS, ANTEPARTUM: Status: ACTIVE | Noted: 2020-04-28

## 2020-04-28 PROBLEM — Z28.39 RUBELLA NON-IMMUNE STATUS, ANTEPARTUM: Status: ACTIVE | Noted: 2020-04-28

## 2020-07-29 PROBLEM — J01.90 ACUTE NON-RECURRENT SINUSITIS: Status: ACTIVE | Noted: 2020-07-29

## 2020-07-29 PROBLEM — Z20.822 SUSPECTED COVID-19 VIRUS INFECTION: Status: ACTIVE | Noted: 2020-07-29

## 2020-07-29 PROBLEM — Z3A.28 PREGNANCY WITH 28 COMPLETED WEEKS GESTATION: Status: ACTIVE | Noted: 2020-07-29

## 2020-08-05 PROBLEM — Z20.822 SUSPECTED COVID-19 VIRUS INFECTION: Status: RESOLVED | Noted: 2020-07-29 | Resolved: 2020-08-05

## 2020-08-05 PROBLEM — J20.6 ACUTE BRONCHITIS DUE TO RHINOVIRUS: Status: ACTIVE | Noted: 2020-08-05

## 2020-09-16 ENCOUNTER — HOSPITAL ENCOUNTER (OUTPATIENT)
Facility: HOSPITAL | Age: 35
Discharge: HOME | End: 2020-09-17
Attending: OBSTETRICS & GYNECOLOGY | Admitting: OBSTETRICS & GYNECOLOGY
Payer: COMMERCIAL

## 2020-09-16 DIAGNOSIS — Z33.1 IUP (INTRAUTERINE PREGNANCY), INCIDENTAL: ICD-10-CM

## 2020-09-16 LAB
ABO + RH BLD: NORMAL
ALBUMIN SERPL BCP-MCNC: 2.7 G/DL (ref 3.5–5.2)
ALP SERPL-CCNC: 171 U/L (ref 55–135)
ALT SERPL W/O P-5'-P-CCNC: 15 U/L (ref 10–44)
AMPHET+METHAMPHET UR QL: NEGATIVE
ANION GAP SERPL CALC-SCNC: 14 MMOL/L (ref 8–16)
AST SERPL-CCNC: 24 U/L (ref 10–40)
BARBITURATES UR QL SCN>200 NG/ML: NEGATIVE
BASOPHILS # BLD AUTO: 0.03 K/UL (ref 0–0.2)
BASOPHILS NFR BLD: 0.3 % (ref 0–1.9)
BENZODIAZ UR QL SCN>200 NG/ML: NEGATIVE
BILIRUB SERPL-MCNC: 0.6 MG/DL (ref 0.1–1)
BILIRUB UR QL STRIP: NEGATIVE
BLD GP AB SCN CELLS X3 SERPL QL: NORMAL
BUN SERPL-MCNC: <5 MG/DL (ref 6–20)
BZE UR QL SCN: NEGATIVE
CALCIUM SERPL-MCNC: 8.3 MG/DL (ref 8.7–10.5)
CANNABINOIDS UR QL SCN: NEGATIVE
CHLORIDE SERPL-SCNC: 104 MMOL/L (ref 95–110)
CLARITY UR: CLEAR
CO2 SERPL-SCNC: 21 MMOL/L (ref 23–29)
COLOR UR: YELLOW
CREAT SERPL-MCNC: 0.5 MG/DL (ref 0.5–1.4)
CREAT UR-MCNC: 16.9 MG/DL (ref 15–325)
DIFFERENTIAL METHOD: ABNORMAL
EOSINOPHIL # BLD AUTO: 0.1 K/UL (ref 0–0.5)
EOSINOPHIL NFR BLD: 0.6 % (ref 0–8)
ERYTHROCYTE [DISTWIDTH] IN BLOOD BY AUTOMATED COUNT: 14.9 % (ref 11.5–14.5)
EST. GFR  (AFRICAN AMERICAN): >60 ML/MIN/1.73 M^2
EST. GFR  (NON AFRICAN AMERICAN): >60 ML/MIN/1.73 M^2
GLUCOSE SERPL-MCNC: 80 MG/DL (ref 70–110)
GLUCOSE UR QL STRIP: NEGATIVE
HCT VFR BLD AUTO: 34.2 % (ref 37–48.5)
HGB BLD-MCNC: 10.9 G/DL (ref 12–16)
HGB UR QL STRIP: NEGATIVE
IMM GRANULOCYTES # BLD AUTO: 0.04 K/UL (ref 0–0.04)
IMM GRANULOCYTES NFR BLD AUTO: 0.4 % (ref 0–0.5)
KETONES UR QL STRIP: NEGATIVE
LEUKOCYTE ESTERASE UR QL STRIP: NEGATIVE
LYMPHOCYTES # BLD AUTO: 1.8 K/UL (ref 1–4.8)
LYMPHOCYTES NFR BLD: 19.2 % (ref 18–48)
MCH RBC QN AUTO: 26.7 PG (ref 27–31)
MCHC RBC AUTO-ENTMCNC: 31.9 G/DL (ref 32–36)
MCV RBC AUTO: 84 FL (ref 82–98)
MONOCYTES # BLD AUTO: 0.9 K/UL (ref 0.3–1)
MONOCYTES NFR BLD: 9.1 % (ref 4–15)
NEUTROPHILS # BLD AUTO: 6.7 K/UL (ref 1.8–7.7)
NEUTROPHILS NFR BLD: 70.4 % (ref 38–73)
NITRITE UR QL STRIP: NEGATIVE
NRBC BLD-RTO: 0 /100 WBC
OPIATES UR QL SCN: NEGATIVE
PCP UR QL SCN>25 NG/ML: NEGATIVE
PH UR STRIP: 7 [PH] (ref 5–8)
PLATELET # BLD AUTO: 213 K/UL (ref 150–350)
PMV BLD AUTO: 12.4 FL (ref 9.2–12.9)
POTASSIUM SERPL-SCNC: 3.7 MMOL/L (ref 3.5–5.1)
PROT SERPL-MCNC: 6.2 G/DL (ref 6–8.4)
PROT UR QL STRIP: NEGATIVE
RBC # BLD AUTO: 4.09 M/UL (ref 4–5.4)
SARS-COV-2 RDRP RESP QL NAA+PROBE: NEGATIVE
SODIUM SERPL-SCNC: 139 MMOL/L (ref 136–145)
SP GR UR STRIP: 1.01 (ref 1–1.03)
TOXICOLOGY INFORMATION: NORMAL
URN SPEC COLLECT METH UR: NORMAL
UROBILINOGEN UR STRIP-ACNC: NEGATIVE EU/DL
WBC # BLD AUTO: 9.55 K/UL (ref 3.9–12.7)

## 2020-09-16 PROCEDURE — 63600175 PHARM REV CODE 636 W HCPCS: Performed by: OBSTETRICS & GYNECOLOGY

## 2020-09-16 PROCEDURE — 25000003 PHARM REV CODE 250: Performed by: OBSTETRICS & GYNECOLOGY

## 2020-09-16 PROCEDURE — 80053 COMPREHEN METABOLIC PANEL: CPT

## 2020-09-16 PROCEDURE — G0378 HOSPITAL OBSERVATION PER HR: HCPCS

## 2020-09-16 PROCEDURE — U0002 COVID-19 LAB TEST NON-CDC: HCPCS

## 2020-09-16 PROCEDURE — 86850 RBC ANTIBODY SCREEN: CPT

## 2020-09-16 PROCEDURE — 36415 COLL VENOUS BLD VENIPUNCTURE: CPT

## 2020-09-16 PROCEDURE — 80307 DRUG TEST PRSMV CHEM ANLYZR: CPT

## 2020-09-16 PROCEDURE — 87491 CHLMYD TRACH DNA AMP PROBE: CPT

## 2020-09-16 PROCEDURE — 96375 TX/PRO/DX INJ NEW DRUG ADDON: CPT

## 2020-09-16 PROCEDURE — 59025 FETAL NON-STRESS TEST: CPT

## 2020-09-16 PROCEDURE — 96361 HYDRATE IV INFUSION ADD-ON: CPT

## 2020-09-16 PROCEDURE — 81003 URINALYSIS AUTO W/O SCOPE: CPT | Mod: 59

## 2020-09-16 PROCEDURE — 96372 THER/PROPH/DIAG INJ SC/IM: CPT

## 2020-09-16 PROCEDURE — 99211 OFF/OP EST MAY X REQ PHY/QHP: CPT | Mod: 25

## 2020-09-16 PROCEDURE — 96374 THER/PROPH/DIAG INJ IV PUSH: CPT

## 2020-09-16 PROCEDURE — 85025 COMPLETE CBC W/AUTO DIFF WBC: CPT

## 2020-09-16 PROCEDURE — 96372 THER/PROPH/DIAG INJ SC/IM: CPT | Mod: 59

## 2020-09-16 RX ORDER — BETAMETHASONE SODIUM PHOSPHATE AND BETAMETHASONE ACETATE 3; 3 MG/ML; MG/ML
12 INJECTION, SUSPENSION INTRA-ARTICULAR; INTRALESIONAL; INTRAMUSCULAR; SOFT TISSUE ONCE
Status: COMPLETED | OUTPATIENT
Start: 2020-09-16 | End: 2020-09-16

## 2020-09-16 RX ORDER — BUTORPHANOL TARTRATE 2 MG/ML
2 INJECTION INTRAMUSCULAR; INTRAVENOUS
Status: DISCONTINUED | OUTPATIENT
Start: 2020-09-16 | End: 2020-09-17 | Stop reason: HOSPADM

## 2020-09-16 RX ORDER — TERBUTALINE SULFATE 1 MG/ML
0.25 INJECTION SUBCUTANEOUS ONCE
Status: COMPLETED | OUTPATIENT
Start: 2020-09-16 | End: 2020-09-16

## 2020-09-16 RX ORDER — BUTORPHANOL TARTRATE 2 MG/ML
2 INJECTION INTRAMUSCULAR; INTRAVENOUS ONCE
Status: COMPLETED | OUTPATIENT
Start: 2020-09-16 | End: 2020-09-16

## 2020-09-16 RX ORDER — TERBUTALINE SULFATE 1 MG/ML
0.25 INJECTION SUBCUTANEOUS ONCE
Status: COMPLETED | OUTPATIENT
Start: 2020-09-17 | End: 2020-09-17

## 2020-09-16 RX ORDER — TERBUTALINE SULFATE 1 MG/ML
INJECTION SUBCUTANEOUS
Status: DISCONTINUED
Start: 2020-09-16 | End: 2020-09-17 | Stop reason: HOSPADM

## 2020-09-16 RX ORDER — BETAMETHASONE SODIUM PHOSPHATE AND BETAMETHASONE ACETATE 3; 3 MG/ML; MG/ML
12 INJECTION, SUSPENSION INTRA-ARTICULAR; INTRALESIONAL; INTRAMUSCULAR; SOFT TISSUE ONCE
Status: DISCONTINUED | OUTPATIENT
Start: 2020-09-17 | End: 2020-09-17 | Stop reason: HOSPADM

## 2020-09-16 RX ORDER — SODIUM CHLORIDE, SODIUM LACTATE, POTASSIUM CHLORIDE, CALCIUM CHLORIDE 600; 310; 30; 20 MG/100ML; MG/100ML; MG/100ML; MG/100ML
INJECTION, SOLUTION INTRAVENOUS CONTINUOUS
Status: DISCONTINUED | OUTPATIENT
Start: 2020-09-16 | End: 2020-09-17 | Stop reason: HOSPADM

## 2020-09-16 RX ADMIN — PROMETHAZINE HYDROCHLORIDE 25 MG: 25 INJECTION INTRAMUSCULAR; INTRAVENOUS at 06:09

## 2020-09-16 RX ADMIN — BETAMETHASONE SODIUM PHOSPHATE AND BETAMETHASONE ACETATE 12 MG: 3; 3 INJECTION, SUSPENSION INTRA-ARTICULAR; INTRALESIONAL; INTRAMUSCULAR at 06:09

## 2020-09-16 RX ADMIN — SODIUM CHLORIDE, SODIUM LACTATE, POTASSIUM CHLORIDE, AND CALCIUM CHLORIDE: .6; .31; .03; .02 INJECTION, SOLUTION INTRAVENOUS at 08:09

## 2020-09-16 RX ADMIN — SODIUM CHLORIDE, SODIUM LACTATE, POTASSIUM CHLORIDE, AND CALCIUM CHLORIDE 1000 ML: .6; .31; .03; .02 INJECTION, SOLUTION INTRAVENOUS at 06:09

## 2020-09-16 RX ADMIN — BUTORPHANOL TARTRATE 2 MG: 2 INJECTION, SOLUTION INTRAMUSCULAR; INTRAVENOUS at 06:09

## 2020-09-16 RX ADMIN — TERBUTALINE SULFATE 0.25 MG: 1 INJECTION SUBCUTANEOUS at 06:09

## 2020-09-17 ENCOUNTER — HOSPITAL ENCOUNTER (OUTPATIENT)
Dept: OBSTETRICS AND GYNECOLOGY | Facility: HOSPITAL | Age: 35
Discharge: HOME | End: 2020-09-17
Attending: OBSTETRICS & GYNECOLOGY
Payer: COMMERCIAL

## 2020-09-17 VITALS
TEMPERATURE: 98 F | OXYGEN SATURATION: 97 % | HEART RATE: 93 BPM | RESPIRATION RATE: 18 BRPM | DIASTOLIC BLOOD PRESSURE: 94 MMHG | SYSTOLIC BLOOD PRESSURE: 135 MMHG

## 2020-09-17 VITALS
RESPIRATION RATE: 18 BRPM | DIASTOLIC BLOOD PRESSURE: 77 MMHG | TEMPERATURE: 99 F | SYSTOLIC BLOOD PRESSURE: 133 MMHG | HEART RATE: 94 BPM

## 2020-09-17 DIAGNOSIS — O10.919 CHRONIC HYPERTENSION AFFECTING PREGNANCY: Primary | ICD-10-CM

## 2020-09-17 PROCEDURE — 96372 THER/PROPH/DIAG INJ SC/IM: CPT

## 2020-09-17 PROCEDURE — 59025 FETAL NON-STRESS TEST: CPT

## 2020-09-17 PROCEDURE — 63600175 PHARM REV CODE 636 W HCPCS: Performed by: OBSTETRICS & GYNECOLOGY

## 2020-09-17 PROCEDURE — 96361 HYDRATE IV INFUSION ADD-ON: CPT

## 2020-09-17 PROCEDURE — 99211 OFF/OP EST MAY X REQ PHY/QHP: CPT | Mod: 25

## 2020-09-17 RX ORDER — BETAMETHASONE SODIUM PHOSPHATE AND BETAMETHASONE ACETATE 3; 3 MG/ML; MG/ML
12 INJECTION, SUSPENSION INTRA-ARTICULAR; INTRALESIONAL; INTRAMUSCULAR; SOFT TISSUE ONCE
Status: COMPLETED | OUTPATIENT
Start: 2020-09-17 | End: 2020-09-17

## 2020-09-17 RX ORDER — BETAMETHASONE SODIUM PHOSPHATE AND BETAMETHASONE ACETATE 3; 3 MG/ML; MG/ML
12 INJECTION, SUSPENSION INTRA-ARTICULAR; INTRALESIONAL; INTRAMUSCULAR; SOFT TISSUE ONCE
Qty: 2 ML | Refills: 0 | Status: SHIPPED | OUTPATIENT
Start: 2020-09-17 | End: 2020-09-17

## 2020-09-17 RX ADMIN — BETAMETHASONE SODIUM PHOSPHATE AND BETAMETHASONE ACETATE 12 MG: 3; 3 INJECTION, SUSPENSION INTRA-ARTICULAR; INTRALESIONAL; INTRAMUSCULAR at 08:09

## 2020-09-17 RX ADMIN — TERBUTALINE SULFATE 0.25 MG: 1 INJECTION SUBCUTANEOUS at 12:09

## 2020-09-17 RX ADMIN — SODIUM CHLORIDE, SODIUM LACTATE, POTASSIUM CHLORIDE, AND CALCIUM CHLORIDE: .6; .31; .03; .02 INJECTION, SOLUTION INTRAVENOUS at 04:09

## 2020-09-17 NOTE — NURSING
Patient was given d/c instructions and she v/u of all instructions and taken to personal vehicle via wheelchair at this time.  Patient tolerated well.  No distress noted.  Respirations are even and unlabored.  No complaints at this time.

## 2020-09-17 NOTE — NURSING
OB NURSE TRIAGE NOTE           Chief Complaint:  No chief complaint on file.      S: 34 y.o.  G_4_ P_2_ with IUP @  _35w2d_____ present with c/o No chief complaint on file.       O:  VS:  Temp:   Vitals:    09/17/20 0705 09/17/20 0710 09/17/20 0715 09/17/20 0720   BP: 129/67   (!) 135/94   Pulse: 108 (!) 121 84 93   Resp:    18   Temp:    98.2 °F (36.8 °C)   TempSrc:    Oral   SpO2: (!) 94% 96% 96% 97%     _     FHT'S __135____     CTX'S __irregular____     SVE:   _2.5/60/-2____    A: IUP @ 35w2d         DX: contractions      P:  ORDERS:    Orders Placed This Encounter   Procedures    C. trachomatis/N. gonorrhoeae by AMP DNA [facility]; Urine     Standing Status:   Standing     Number of Occurrences:   1     Order Specific Question:   Sources by Resulting Lab:     Answer:   [facility]     Order Specific Question:   Source:     Answer:   Urine    Urinalysis, Reflex to Urine Culture Urine, Clean Catch     Standing Status:   Standing     Number of Occurrences:   1     Order Specific Question:   Preferred Collection Type     Answer:   Urine, Clean Catch     Order Specific Question:   Specimen Source     Answer:   Urine    Drug screen panel, emergency     Standing Status:   Standing     Number of Occurrences:   1     Order Specific Question:   Specimen Source     Answer:   Urine    CBC auto differential     Standing Status:   Standing     Number of Occurrences:   1    Comprehensive metabolic panel     Standing Status:   Standing     Number of Occurrences:   1    COVID-19 Rapid Screening     Standing Status:   Standing     Number of Occurrences:   1     Order Specific Question:   Is the patient symptomatic?     Answer:   No    Protein, urine, timed     Standing Status:   Standing     Number of Occurrences:   1     Order Specific Question:   Specimen Source     Answer:   Urine     Order Specific Question:   Collection Duration (Number of Hours)     Answer:   24 Hours [24]    Vital signs     After 4 occurrences  monitor vitals every hour.     Standing Status:   Standing     Number of Occurrences:   1    Vital Signs (Specify Frequency)     Standing Status:   Standing     Number of Occurrences:   1    Fetal monitoring     Standing Status:   Standing     Number of Occurrences:   1    Continuous tocometry     Standing Status:   Standing     Number of Occurrences:   1    Discharge Criteria     Discharge to home when the following criteria are met: 1.) Minimal nausea/vomiting 2.) Vital signs are stable  3.) Fetal heart rate shows (absence of fetal tachycardia, fetal bradycardia,  variable decelerations, minimal or absent deceleration or late deceleration)     Standing Status:   Standing     Number of Occurrences:   1    Notify clinical provider     Of patient arrival in triage after evaluation.     Standing Status:   Standing     Number of Occurrences:   1    Notify clinical provider     Standing Status:   Standing     Number of Occurrences:   1     Order Specific Question:   Temperature greater than or equal to     Answer:   100.4     Order Specific Question:   Systolic Blood Pressure SBP greater than or equal to     Answer:   160     Order Specific Question:   Systolic Blood Pressure SBP less than or equal to     Answer:   90     Order Specific Question:   Diastolic Blood Pressure DBP greater than or equal to     Answer:   110     Order Specific Question:   Diastolic Blood Pressure DBP less than or equal to     Answer:   60     Order Specific Question:   Pulse greater than or equal to     Answer:   120     Order Specific Question:   Pulse less than or equal to     Answer:   50     Order Specific Question:   Respirations Rate RR greater than or equal to     Answer:   30     Order Specific Question:   Respirations Rate RR less than or equal to     Answer:   10     Order Specific Question:   Urine output less than     Answer:   30     Comments:   mL in 2 hours     Order Specific Question:   SPO2% less than     Answer:   95     Full code     Standing Status:   Standing     Number of Occurrences:   1    Type & Screen, Labor & Delivery     Standing Status:   Standing     Number of Occurrences:   1    Obtain Fetal nonstress test (NST)     Standing Status:   Standing     Number of Occurrences:   1     Order Specific Question:   Diagnosis:     Answer:   IUP (intrauterine pregnancy), incidental [373436]     Order Specific Question:   Duration:     Answer:   20 minutes    Insert peripheral IV     Standing Status:   Standing     Number of Occurrences:   1    Place in Outpatient     Standing Status:   Standing     Number of Occurrences:   1     Order Specific Question:   Diagnosis     Answer:   IUP (intrauterine pregnancy), incidental [780587]     Order Specific Question:   Special Needs:     Answer:   No Special Needs [1]    ED to L&D Observation     Standing Status:   Standing     Number of Occurrences:   1     Order Specific Question:   Transfer To (Destination)     Answer:   [facility] AND DELIVERY [[id number]]     Order Specific Question:   Diagnosis     Answer:   IUP (intrauterine pregnancy), incidental [975223]     Order Specific Question:   Future Attending Provider     Answer:   MANJU GALO [[id number]]    Place in Observation     Standing Status:   Standing     Number of Occurrences:   1     Order Specific Question:   Diagnosis     Answer:   IUP (intrauterine pregnancy), incidental [870805]     Order Specific Question:   Future Attending Provider     Answer:   MANJU GALO [[id number]]     Order Specific Question:   Admitting Provider:     Answer:   MANJU GALO [[id number]]    Place in Outpatient     Standing Status:   Standing     Number of Occurrences:   1     Order Specific Question:   Diagnosis     Answer:   IUP (intrauterine pregnancy), incidental [266197]     Order Specific Question:   Special Needs:     Answer:   No Special Needs [1]        F/U:   9/18/20 at [facility]'s [place]     RX: none     ETC: 2nd [facility] 9/17 @ 1930 and 24  hour urine due 9/17 @ 1930      PRECAUTIONS:  Labor precautions, kick counts

## 2020-09-17 NOTE — DISCHARGE INSTRUCTIONS
Follow up tonight at the hospital for your second Betamethasone injection and to turn in your 24 hour urine.  Follow up at [facility]'s [place] tomorrow, 9/18/20.

## 2020-09-17 NOTE — H&P
[facility] - Labor and Delivery  History & Physical  Obstetrics   Labor and Delivery     SUBJECTIVE:     Patient Info:  Linda Hart         34 y.o.    female    1985     Chief Complaint/Reason for Admission: Cramping    History of Present Illness:  33yo  at 35 1/7wga who presents for cramping.  Denies VB, LOF, decr FM.  Denies HA, blurry vision, CP, SOB, RUQ pain.    OB History:   OB History        4    Para   2    Term   2       0    AB   1    Living   2       SAB   1    TAB   0    Ectopic   0    Multiple   0    Live Births   2                   Estimated Date of Delivery: 10/20/20         Past Medical History:  Past Medical History:   Diagnosis Date    Anemia     Herpes simplex virus (HSV) infection     Hypertension         Past Surgical History:  Past Surgical History:   Procedure Laterality Date    DILATION AND CURETTAGE OF UTERUS      REPAIR OF INCARCERATED VENTRAL HERNIA WITHOUT HISTORY OF PRIOR REPAIR N/A 2020    Procedure: REPAIR, HERNIA, VENTRAL, INCARCERATED, WITHOUT HISTORY OF PRIOR REPAIR;  Surgeon: Amadou Chambers MD;  Location: [facility] OR;  Service: General;  Laterality: N/A;       Social History:   Alcohol/Tobacco:  Social History     Tobacco Use    Smoking status: Never Smoker    Smokeless tobacco: Never Used   Substance Use Topics    Alcohol use: No     Frequency: Never      Drug Use:  Social History     Substance and Sexual Activity   Drug Use No       Family History:  Family History   Problem Relation Age of Onset    Hyperlipidemia Sister        Allergies:  Review of patient's allergies indicates:  No Known Allergies    Meds Prior to Arrival:  Prior to Admission medications    Medication Sig Start Date End Date Taking? Authorizing Provider   aspirin 81 MG Chew Take 81 mg by mouth once daily.    Historical Provider   prenatal vit/iron fum/folic ac (PRENATAL 1+1 ORAL) Take by mouth.    Historical Provider        Review of  Systems:  Negative than in HPI    OBJECTIVE:     Recent Vitals:  /80   Pulse 102   Temp 98.5 °F (36.9 °C)   Resp 16   Vitals:    09/17/20 0705 09/17/20 0710 09/17/20 0715 09/17/20 0720   BP: 129/67   (!) 135/94   Pulse: 108 (!) 121 84 93   Resp:    18   Temp:    98.2 °F (36.8 °C)   TempSrc:    Oral   SpO2: (!) 94% 96% 96% 97%       Exam:  Physical Exam   Constitutional: She is well-developed, well-nourished, and in no distress.   Cardiovascular: Regular rhythm.   Pulmonary/Chest: Effort normal.   Abdominal: Soft.   Neurological: She is alert.   Skin: Skin is warm.   Psychiatric: Affect normal.       Cervix  2.5/60/-2    FHT reassuring  [place] contractions q2-3 min    Lab Results:  Recent Results (from the past 36 hour(s))   Urinalysis, Reflex to Urine Culture Urine, Clean Catch    Collection Time: 09/16/20  5:26 PM    Specimen: Urine   Result Value Ref Range    Specimen UA Urine, Clean Catch     Color, UA Yellow Yellow, Straw, Gabrielle    Appearance, UA Clear Clear    pH, UA 7.0 5.0 - 8.0    Specific Gravity, UA 1.010 1.005 - 1.030    Protein, UA Negative Negative    Glucose, UA Negative Negative    Ketones, UA Negative Negative    Bilirubin (UA) Negative Negative    Occult Blood UA Negative Negative    Nitrite, UA Negative Negative    Urobilinogen, UA Negative Negative EU/dL    Leukocytes, UA Negative Negative   Drug screen panel, emergency    Collection Time: 09/16/20  5:26 PM   Result Value Ref Range    Benzodiazepines Negative     Cocaine (Metab.) Negative     Opiate Scrn, Ur Negative     Barbiturate Screen, Ur Negative     Amphetamine Screen, Ur Negative     THC Negative     Phencyclidine Negative     Creatinine, Random Ur 16.9 15.0 - 325.0 mg/dL    Toxicology Information SEE COMMENT    COVID-19 Rapid Screening    Collection Time: 09/16/20  6:23 PM   Result Value Ref Range    SARS-CoV-2 RNA, Amplification, Qual Negative Negative   CBC auto differential    Collection Time: 09/16/20  6:32 PM   Result Value  Ref Range    WBC 9.55 3.90 - 12.70 K/uL    RBC 4.09 4.00 - 5.40 M/uL    Hemoglobin 10.9 (L) 12.0 - 16.0 g/dL    Hematocrit 34.2 (L) 37.0 - 48.5 %    Mean Corpuscular Volume 84 82 - 98 fL    Mean Corpuscular Hemoglobin 26.7 (L) 27.0 - 31.0 pg    Mean Corpuscular Hemoglobin Conc 31.9 (L) 32.0 - 36.0 g/dL    RDW 14.9 (H) 11.5 - 14.5 %    Platelets 213 150 - 350 K/uL    MPV 12.4 9.2 - 12.9 fL    Immature Granulocytes 0.4 0.0 - 0.5 %    Gran # (ANC) 6.7 1.8 - 7.7 K/uL    Immature Grans (Abs) 0.04 0.00 - 0.04 K/uL    Lymph # 1.8 1.0 - 4.8 K/uL    Mono # 0.9 0.3 - 1.0 K/uL    Eos # 0.1 0.0 - 0.5 K/uL    Baso # 0.03 0.00 - 0.20 K/uL    nRBC 0 0 /100 WBC    Gran% 70.4 38.0 - 73.0 %    Lymph% 19.2 18.0 - 48.0 %    Mono% 9.1 4.0 - 15.0 %    Eosinophil% 0.6 0.0 - 8.0 %    Basophil% 0.3 0.0 - 1.9 %    Differential Method Automated    Comprehensive metabolic panel    Collection Time: 09/16/20  6:32 PM   Result Value Ref Range    Sodium 139 136 - 145 mmol/L    Potassium 3.7 3.5 - 5.1 mmol/L    Chloride 104 95 - 110 mmol/L    CO2 21 (L) 23 - 29 mmol/L    Glucose 80 70 - 110 mg/dL    BUN, Bld <5 (L) 6 - 20 mg/dL    Creatinine 0.5 0.5 - 1.4 mg/dL    Calcium 8.3 (L) 8.7 - 10.5 mg/dL    Total Protein 6.2 6.0 - 8.4 g/dL    Albumin 2.7 (L) 3.5 - 5.2 g/dL    Total Bilirubin 0.6 0.1 - 1.0 mg/dL    Alkaline Phosphatase 171 (H) 55 - 135 U/L    AST 24 10 - 40 U/L    ALT 15 10 - 44 U/L    Anion Gap 14 8 - 16 mmol/L    eGFR if African American >60.0 >60 mL/min/1.73 m^2    eGFR if non African American >60.0 >60 mL/min/1.73 m^2   Type & Screen, Labor & Delivery    Collection Time: 09/16/20  6:32 PM   Result Value Ref Range    Group & Rh O POS     Indirect Baljinder GEL NEG      Lab Results   Component Value Date    GROUPTRH O POS 09/16/2020          Diagnostic Studies:    Component      Latest Ref Rng & Units 9/16/2020 9/16/2020 9/16/2020           6:32 PM  6:23 PM  5:26 PM   WBC      3.90 - 12.70 K/uL 9.55     RBC      4.00 - 5.40 M/uL 4.09      Hemoglobin      12.0 - 16.0 g/dL 10.9 (L)     Hematocrit      37.0 - 48.5 % 34.2 (L)     MCV      82 - 98 fL 84     MCH      27.0 - 31.0 pg 26.7 (L)     MCHC      32.0 - 36.0 g/dL 31.9 (L)     RDW      11.5 - 14.5 % 14.9 (H)     Platelets      150 - 350 K/uL 213     MPV      9.2 - 12.9 fL 12.4     Immature Granulocytes      0.0 - 0.5 % 0.4     Gran # (ANC)      1.8 - 7.7 K/uL 6.7     Immature Grans (Abs)      0.00 - 0.04 K/uL 0.04     Lymph #      1.0 - 4.8 K/uL 1.8     Mono #      0.3 - 1.0 K/uL 0.9     Eos #      0.0 - 0.5 K/uL 0.1     Baso #      0.00 - 0.20 K/uL 0.03     nRBC      0 /100 WBC 0     Gran%      38.0 - 73.0 % 70.4     Lymph%      18.0 - 48.0 % 19.2     Mono%      4.0 - 15.0 % 9.1     Eosinophil%      0.0 - 8.0 % 0.6     Basophil%      0.0 - 1.9 % 0.3     Differential Method       Automated     Sodium      136 - 145 mmol/L 139     Potassium      3.5 - 5.1 mmol/L 3.7     Chloride      95 - 110 mmol/L 104     CO2      23 - 29 mmol/L 21 (L)     Glucose      70 - 110 mg/dL 80     BUN, Bld      6 - 20 mg/dL <5 (L)     Creatinine      0.5 - 1.4 mg/dL 0.5     Calcium      8.7 - 10.5 mg/dL 8.3 (L)     PROTEIN TOTAL      6.0 - 8.4 g/dL 6.2     Albumin      3.5 - 5.2 g/dL 2.7 (L)     BILIRUBIN TOTAL      0.1 - 1.0 mg/dL 0.6     Alkaline Phosphatase      55 - 135 U/L 171 (H)     AST      10 - 40 U/L 24     ALT      10 - 44 U/L 15     Anion Gap      8 - 16 mmol/L 14     eGFR if African American      >60 mL/min/1.73 m:2 >60.0     eGFR if non African American      >60 mL/min/1.73 m:2 >60.0     Specimen UA            Color, UA      Yellow, Straw, Gabrielle      Appearance, UA      Clear      pH, UA      5.0 - 8.0      Specific Gravity, UA      1.005 - 1.030      Protein, UA      Negative      Glucose, UA      Negative      Ketones, UA      Negative      Bilirubin (UA)      Negative      Occult Blood UA      Negative      NITRITE UA      Negative      UROBILINOGEN UA      Negative EU/dL      Leukocytes, UA       Negative      Phencyclidine         Negative   Cocaine (Metab.)            Opiate Scrn, Ur            Barbiturate Screen, Ur            Amphetamine Screen, Ur            Marijuana (THC) Metabolite            Creatinine, Random Ur      15.0 - 325.0 mg/dL      Toxicology Information            Urine Total Volume      mL      Urine Collection Duration      Hr      PROTEIN URINE      0 - 15 mg/dL      Urine Protein, Timed      0 - 100 mg/Spec      Group & Rh       O POS     Indirect Baljinder GEL       NEG     SARS-CoV-2 RNA, Amplification, Qual      Negative  Negative      Component      Latest Ref Rng & Units 9/16/2020 9/16/2020           5:26 PM  5:26 PM   WBC      3.90 - 12.70 K/uL     RBC      4.00 - 5.40 M/uL     Hemoglobin      12.0 - 16.0 g/dL     Hematocrit      37.0 - 48.5 %     MCV      82 - 98 fL     MCH      27.0 - 31.0 pg     MCHC      32.0 - 36.0 g/dL     RDW      11.5 - 14.5 %     Platelets      150 - 350 K/uL     MPV      9.2 - 12.9 fL     Immature Granulocytes      0.0 - 0.5 %     Gran # (ANC)      1.8 - 7.7 K/uL     Immature Grans (Abs)      0.00 - 0.04 K/uL     Lymph #      1.0 - 4.8 K/uL     Mono #      0.3 - 1.0 K/uL     Eos #      0.0 - 0.5 K/uL     Baso #      0.00 - 0.20 K/uL     nRBC      0 /100 WBC     Gran%      38.0 - 73.0 %     Lymph%      18.0 - 48.0 %     Mono%      4.0 - 15.0 %     Eosinophil%      0.0 - 8.0 %     Basophil%      0.0 - 1.9 %     Differential Method           Sodium      136 - 145 mmol/L     Potassium      3.5 - 5.1 mmol/L     Chloride      95 - 110 mmol/L     CO2      23 - 29 mmol/L     Glucose      70 - 110 mg/dL     BUN, Bld      6 - 20 mg/dL     Creatinine      0.5 - 1.4 mg/dL     Calcium      8.7 - 10.5 mg/dL     PROTEIN TOTAL      6.0 - 8.4 g/dL     Albumin      3.5 - 5.2 g/dL     BILIRUBIN TOTAL      0.1 - 1.0 mg/dL     Alkaline Phosphatase      55 - 135 U/L     AST      10 - 40 U/L     ALT      10 - 44 U/L     Anion Gap      8 - 16 mmol/L     eGFR if African  American      >60 mL/min/1.73 m:2     eGFR if non African American      >60 mL/min/1.73 m:2     Specimen UA        Urine, Clean Catch   Color, UA      Yellow, Straw, Gabrielle  Yellow   Appearance, UA      Clear  Clear   pH, UA      5.0 - 8.0  7.0   Specific Gravity, UA      1.005 - 1.030  1.010   Protein, UA      Negative  Negative   Glucose, UA      Negative  Negative   Ketones, UA      Negative  Negative   Bilirubin (UA)      Negative  Negative   Occult Blood UA      Negative  Negative   NITRITE UA      Negative  Negative   UROBILINOGEN UA      Negative EU/dL  Negative   Leukocytes, UA      Negative  Negative   Phencyclidine       Negative    Cocaine (Metab.)       Negative    Opiate Scrn, Ur       Negative    Barbiturate Screen, Ur       Negative    Amphetamine Screen, Ur       Negative    Marijuana (THC) Metabolite       Negative    Creatinine, Random Ur      15.0 - 325.0 mg/dL 16.9    Toxicology Information       SEE COMMENT    Urine Total Volume      mL     Urine Collection Duration      Hr     PROTEIN URINE      0 - 15 mg/dL     Urine Protein, Timed      0 - 100 mg/Spec     Group & Rh           Indirect Baljinder GEL           SARS-CoV-2 RNA, Amplification, Qual      Negative       Urine Protein, Timed 0 - 100 mg/Spec 298High           ASSESSMENT/PLAN:     Dx:IUP (intrauterine pregnancy), incidental [Z34.90]  IUP (intrauterine pregnancy), incidental [Z34.90]  IUP (intrauterine pregnancy), incidental [Z34.90]  Active Hospital Problems    Diagnosis  POA    IUP (intrauterine pregnancy), incidental [Z34.90]  Not Applicable      Resolved Hospital Problems   No resolved problems to display.           33yo  at 35 1/7wga here with  labor and elevated BP with h/o cHTN    - Pt presented with occasional severe range BP, not sustained, improved with supportive care for  labor, no other symptoms. 24 hour urine protein and labs obtained.  -  labor: given course of  steroids, IVF and tocolysis.   Ctx spaced out, no cervical change.   Patient then discharged with pre-E and labor precautions    Code Status: Full Code       Diagnostic Plan/Orders:  Orders Placed This Encounter   Procedures    C. trachomatis/N. gonorrhoeae by AMP DNA [facility]; Urine    Urinalysis, Reflex to Urine Culture Urine, Clean Catch    Drug screen panel, emergency    CBC auto differential    Comprehensive metabolic panel    COVID-19 Rapid Screening    Protein, urine, timed    Vital signs    Vital Signs (Specify Frequency)    Fetal monitoring    Continuous tocometry    Discharge Criteria    Notify clinical provider    Notify clinical provider    Full code    Type & Screen, Labor & Delivery    Obtain Fetal nonstress test (NST)    Insert peripheral IV    Place in Outpatient    ED to L&D Observation    Place in Observation        Scheduled Meds/IV Therapy:   betamethasone acetate-betamethasone sodium phosphate, 12 mg, Once  promethazine IVPB, 25 mg, Once           lactated ringers 125 mL/hr at 09/17/20 0428       PRN Meds:  butorphanol, 2 mg, Q3H PRN  promethazine (PHENERGAN) IVPB, 25 mg, Q4H PRN

## 2020-09-18 PROBLEM — Z3A.28 PREGNANCY WITH 28 COMPLETED WEEKS GESTATION: Status: RESOLVED | Noted: 2020-07-29 | Resolved: 2020-09-18

## 2020-09-18 NOTE — NURSING
Pt received to Labor and Delivery unit for B/P check and Celestone injection. Pt allowed to rest in bed prior to B/P check. Once B/P was checked and seen as WNL, 133/77.  medication was given.

## 2020-09-21 NOTE — H&P
[facility] - Labor and Delivery  History & Physical  Obstetrics   Labor and Delivery     SUBJECTIVE:     Patient Info:  Linda Hart         34 y.o.    female    1985     Chief Complaint/Reason for Admission: Cramping    History of Present Illness:  35yo  at 35 1/7wga who presents for cramping.  Denies VB, LOF, decr FM.  Denies HA, blurry vision, CP, SOB, RUQ pain.    OB History:   OB History        4    Para   2    Term   2       0    AB   1    Living   2       SAB   1    TAB   0    Ectopic   0    Multiple   0    Live Births   2                   Estimated Date of Delivery: 10/20/20         Past Medical History:  Past Medical History:   Diagnosis Date    Anemia     Herpes simplex virus (HSV) infection     Hypertension         Past Surgical History:  Past Surgical History:   Procedure Laterality Date    DILATION AND CURETTAGE OF UTERUS      REPAIR OF INCARCERATED VENTRAL HERNIA WITHOUT HISTORY OF PRIOR REPAIR N/A 2020    Procedure: REPAIR, HERNIA, VENTRAL, INCARCERATED, WITHOUT HISTORY OF PRIOR REPAIR;  Surgeon: Amadou Chambers MD;  Location: [facility] OR;  Service: General;  Laterality: N/A;       Social History:   Alcohol/Tobacco:  Social History     Tobacco Use    Smoking status: Never Smoker    Smokeless tobacco: Never Used   Substance Use Topics    Alcohol use: No     Frequency: Never      Drug Use:  Social History     Substance and Sexual Activity   Drug Use No       Family History:  Family History   Problem Relation Age of Onset    Hyperlipidemia Sister        Allergies:  Review of patient's allergies indicates:  No Known Allergies    Meds Prior to Arrival:  Prior to Admission medications    Medication Sig Start Date End Date Taking? Authorizing Provider   aspirin 81 MG Chew Take 81 mg by mouth once daily.    Historical Provider   prenatal vit/iron fum/folic ac (PRENATAL 1+1 ORAL) Take by mouth.    Historical Provider        Review of  Systems:  Negative than in HPI    OBJECTIVE:     Recent Vitals:  /77 (BP Location: Right arm, Patient Position: Lying)   Pulse 94   Temp 98.5 °F (36.9 °C) (Oral)   Resp 18   Vitals:    09/17/20 2000   BP: 133/77   BP Location: Right arm   Patient Position: Lying   Pulse: 94   Resp: 18   Temp: 98.5 °F (36.9 °C)   TempSrc: Oral       Exam:  Physical Exam   Constitutional: She is well-developed, well-nourished, and in no distress.   Cardiovascular: Regular rhythm.   Pulmonary/Chest: Effort normal.   Abdominal: Soft.   Neurological: She is alert.   Skin: Skin is warm.   Psychiatric: Affect normal.       Cervix  2.5/60/-2    FHT reassuring  [place] contractions q2-3 min    Lab Results:  No results found for this or any previous visit (from the past 36 hour(s)).  Lab Results   Component Value Date    GROUPTRH O POS 09/16/2020          Diagnostic Studies:    Component      Latest Ref Rng & Units 9/16/2020 9/16/2020 9/16/2020           6:32 PM  6:23 PM  5:26 PM   WBC      3.90 - 12.70 K/uL 9.55     RBC      4.00 - 5.40 M/uL 4.09     Hemoglobin      12.0 - 16.0 g/dL 10.9 (L)     Hematocrit      37.0 - 48.5 % 34.2 (L)     MCV      82 - 98 fL 84     MCH      27.0 - 31.0 pg 26.7 (L)     MCHC      32.0 - 36.0 g/dL 31.9 (L)     RDW      11.5 - 14.5 % 14.9 (H)     Platelets      150 - 350 K/uL 213     MPV      9.2 - 12.9 fL 12.4     Immature Granulocytes      0.0 - 0.5 % 0.4     Gran # (ANC)      1.8 - 7.7 K/uL 6.7     Immature Grans (Abs)      0.00 - 0.04 K/uL 0.04     Lymph #      1.0 - 4.8 K/uL 1.8     Mono #      0.3 - 1.0 K/uL 0.9     Eos #      0.0 - 0.5 K/uL 0.1     Baso #      0.00 - 0.20 K/uL 0.03     nRBC      0 /100 WBC 0     Gran%      38.0 - 73.0 % 70.4     Lymph%      18.0 - 48.0 % 19.2     Mono%      4.0 - 15.0 % 9.1     Eosinophil%      0.0 - 8.0 % 0.6     Basophil%      0.0 - 1.9 % 0.3     Differential Method       Automated     Sodium      136 - 145 mmol/L 139     Potassium      3.5 - 5.1 mmol/L 3.7      Chloride      95 - 110 mmol/L 104     CO2      23 - 29 mmol/L 21 (L)     Glucose      70 - 110 mg/dL 80     BUN, Bld      6 - 20 mg/dL <5 (L)     Creatinine      0.5 - 1.4 mg/dL 0.5     Calcium      8.7 - 10.5 mg/dL 8.3 (L)     PROTEIN TOTAL      6.0 - 8.4 g/dL 6.2     Albumin      3.5 - 5.2 g/dL 2.7 (L)     BILIRUBIN TOTAL      0.1 - 1.0 mg/dL 0.6     Alkaline Phosphatase      55 - 135 U/L 171 (H)     AST      10 - 40 U/L 24     ALT      10 - 44 U/L 15     Anion Gap      8 - 16 mmol/L 14     eGFR if African American      >60 mL/min/1.73 m:2 >60.0     eGFR if non African American      >60 mL/min/1.73 m:2 >60.0     Specimen UA            Color, UA      Yellow, Straw, Gabrielle      Appearance, UA      Clear      pH, UA      5.0 - 8.0      Specific Gravity, UA      1.005 - 1.030      Protein, UA      Negative      Glucose, UA      Negative      Ketones, UA      Negative      Bilirubin (UA)      Negative      Occult Blood UA      Negative      NITRITE UA      Negative      UROBILINOGEN UA      Negative EU/dL      Leukocytes, UA      Negative      Phencyclidine         Negative   Cocaine (Metab.)            Opiate Scrn, Ur            Barbiturate Screen, Ur            Amphetamine Screen, Ur            Marijuana (THC) Metabolite            Creatinine, Random Ur      15.0 - 325.0 mg/dL      Toxicology Information            Urine Total Volume      mL      Urine Collection Duration      Hr      PROTEIN URINE      0 - 15 mg/dL      Urine Protein, Timed      0 - 100 mg/Spec      Group & Rh       O POS     Indirect Baljinder GEL       NEG     SARS-CoV-2 RNA, Amplification, Qual      Negative  Negative      Component      Latest Ref Rng & Units 9/16/2020 9/16/2020           5:26 PM  5:26 PM   WBC      3.90 - 12.70 K/uL     RBC      4.00 - 5.40 M/uL     Hemoglobin      12.0 - 16.0 g/dL     Hematocrit      37.0 - 48.5 %     MCV      82 - 98 fL     MCH      27.0 - 31.0 pg     MCHC      32.0 - 36.0 g/dL     RDW      11.5 - 14.5 %      Platelets      150 - 350 K/uL     MPV      9.2 - 12.9 fL     Immature Granulocytes      0.0 - 0.5 %     Gran # (ANC)      1.8 - 7.7 K/uL     Immature Grans (Abs)      0.00 - 0.04 K/uL     Lymph #      1.0 - 4.8 K/uL     Mono #      0.3 - 1.0 K/uL     Eos #      0.0 - 0.5 K/uL     Baso #      0.00 - 0.20 K/uL     nRBC      0 /100 WBC     Gran%      38.0 - 73.0 %     Lymph%      18.0 - 48.0 %     Mono%      4.0 - 15.0 %     Eosinophil%      0.0 - 8.0 %     Basophil%      0.0 - 1.9 %     Differential Method           Sodium      136 - 145 mmol/L     Potassium      3.5 - 5.1 mmol/L     Chloride      95 - 110 mmol/L     CO2      23 - 29 mmol/L     Glucose      70 - 110 mg/dL     BUN, Bld      6 - 20 mg/dL     Creatinine      0.5 - 1.4 mg/dL     Calcium      8.7 - 10.5 mg/dL     PROTEIN TOTAL      6.0 - 8.4 g/dL     Albumin      3.5 - 5.2 g/dL     BILIRUBIN TOTAL      0.1 - 1.0 mg/dL     Alkaline Phosphatase      55 - 135 U/L     AST      10 - 40 U/L     ALT      10 - 44 U/L     Anion Gap      8 - 16 mmol/L     eGFR if African American      >60 mL/min/1.73 m:2     eGFR if non African American      >60 mL/min/1.73 m:2     Specimen UA        Urine, Clean Catch   Color, UA      Yellow, Straw, Gabrielle  Yellow   Appearance, UA      Clear  Clear   pH, UA      5.0 - 8.0  7.0   Specific Gravity, UA      1.005 - 1.030  1.010   Protein, UA      Negative  Negative   Glucose, UA      Negative  Negative   Ketones, UA      Negative  Negative   Bilirubin (UA)      Negative  Negative   Occult Blood UA      Negative  Negative   NITRITE UA      Negative  Negative   UROBILINOGEN UA      Negative EU/dL  Negative   Leukocytes, UA      Negative  Negative   Phencyclidine       Negative    Cocaine (Metab.)       Negative    Opiate Scrn, Ur       Negative    Barbiturate Screen, Ur       Negative    Amphetamine Screen, Ur       Negative    Marijuana (THC) Metabolite       Negative    Creatinine, Random Ur      15.0 - 325.0 mg/dL 16.9    Toxicology  Information       SEE COMMENT    Urine Total Volume      mL     Urine Collection Duration      Hr     PROTEIN URINE      0 - 15 mg/dL     Urine Protein, Timed      0 - 100 mg/Spec     Group & Rh           Indirect Baljinder GEL           SARS-CoV-2 RNA, Amplification, Qual      Negative       Urine Protein, Timed 0 - 100 mg/Spec 298High           ASSESSMENT/PLAN:     Dx:No admission diagnoses are documented for this encounter.  There are no hospital problems to display for this patient.          33yo  at 35 1/7wga here with  labor and elevated BP with h/o cHTN    - Pt presented with occasional severe range BP, not sustained, improved with supportive care for  labor, no other symptoms. 24 hour urine protein and labs obtained.  -  labor: given course of  steroids, IVF and tocolysis.  Ctx spaced out, no cervical change.   Patient then discharged with pre-E and labor precautions    Code Status: Prior       Diagnostic Plan/Orders:  No orders of the defined types were placed in this encounter.       Scheduled Meds/IV Therapy:             PRN Meds:

## 2020-09-25 ENCOUNTER — ANESTHESIA EVENT (OUTPATIENT)
Dept: OBSTETRICS AND GYNECOLOGY | Facility: HOSPITAL | Age: 35
End: 2020-09-25

## 2020-09-25 ENCOUNTER — HOSPITAL ENCOUNTER (OUTPATIENT)
Facility: HOSPITAL | Age: 35
Discharge: HOME | End: 2020-09-26
Attending: OBSTETRICS & GYNECOLOGY | Admitting: OBSTETRICS & GYNECOLOGY
Payer: COMMERCIAL

## 2020-09-25 ENCOUNTER — ANESTHESIA (OUTPATIENT)
Dept: OBSTETRICS AND GYNECOLOGY | Facility: HOSPITAL | Age: 35
End: 2020-09-25

## 2020-09-25 DIAGNOSIS — O10.913 CHRONIC HYPERTENSION WITH EXACERBATION DURING PREGNANCY IN THIRD TRIMESTER: ICD-10-CM

## 2020-09-25 LAB
ABO + RH BLD: NORMAL
ALBUMIN SERPL BCP-MCNC: 2.6 G/DL (ref 3.5–5.2)
ALP SERPL-CCNC: 168 U/L (ref 55–135)
ALT SERPL W/O P-5'-P-CCNC: 21 U/L (ref 10–44)
ANION GAP SERPL CALC-SCNC: 10 MMOL/L (ref 8–16)
AST SERPL-CCNC: 28 U/L (ref 10–40)
BACTERIA #/AREA URNS HPF: ABNORMAL /HPF
BASOPHILS # BLD AUTO: 0.02 K/UL (ref 0–0.2)
BASOPHILS NFR BLD: 0.2 % (ref 0–1.9)
BILIRUB SERPL-MCNC: 0.4 MG/DL (ref 0.1–1)
BILIRUB UR QL STRIP: NEGATIVE
BLD GP AB SCN CELLS X3 SERPL QL: NORMAL
BUN SERPL-MCNC: 5 MG/DL (ref 6–20)
CALCIUM SERPL-MCNC: 8.4 MG/DL (ref 8.7–10.5)
CHLORIDE SERPL-SCNC: 106 MMOL/L (ref 95–110)
CLARITY UR: CLEAR
CO2 SERPL-SCNC: 21 MMOL/L (ref 23–29)
COLOR UR: YELLOW
CREAT 24H UR-MRATE: 53.3 MG/HR (ref 40–75)
CREAT SERPL-MCNC: 0.6 MG/DL (ref 0.5–1.4)
CREAT UR-MCNC: 42.6 MG/DL (ref 15–325)
CREAT UR-MCNC: 44.1 MG/DL (ref 15–325)
CREATININE, URINE (MG/SPEC): 1278 MG/SPEC
DIFFERENTIAL METHOD: ABNORMAL
EOSINOPHIL # BLD AUTO: 0.1 K/UL (ref 0–0.5)
EOSINOPHIL NFR BLD: 0.7 % (ref 0–8)
ERYTHROCYTE [DISTWIDTH] IN BLOOD BY AUTOMATED COUNT: 15 % (ref 11.5–14.5)
EST. GFR  (AFRICAN AMERICAN): >60 ML/MIN/1.73 M^2
EST. GFR  (NON AFRICAN AMERICAN): >60 ML/MIN/1.73 M^2
GLUCOSE SERPL-MCNC: 74 MG/DL (ref 70–110)
GLUCOSE UR QL STRIP: NEGATIVE
HCT VFR BLD AUTO: 33 % (ref 37–48.5)
HGB BLD-MCNC: 10.7 G/DL (ref 12–16)
HGB UR QL STRIP: NEGATIVE
IMM GRANULOCYTES # BLD AUTO: 0.13 K/UL (ref 0–0.04)
IMM GRANULOCYTES NFR BLD AUTO: 1.3 % (ref 0–0.5)
KETONES UR QL STRIP: NEGATIVE
LDH SERPL L TO P-CCNC: 193 U/L (ref 110–260)
LEUKOCYTE ESTERASE UR QL STRIP: ABNORMAL
LYMPHOCYTES # BLD AUTO: 1.9 K/UL (ref 1–4.8)
LYMPHOCYTES NFR BLD: 18.9 % (ref 18–48)
MCH RBC QN AUTO: 26.7 PG (ref 27–31)
MCHC RBC AUTO-ENTMCNC: 32.4 G/DL (ref 32–36)
MCV RBC AUTO: 82 FL (ref 82–98)
MICROSCOPIC COMMENT: ABNORMAL
MONOCYTES # BLD AUTO: 0.9 K/UL (ref 0.3–1)
MONOCYTES NFR BLD: 9 % (ref 4–15)
NEUTROPHILS # BLD AUTO: 7.2 K/UL (ref 1.8–7.7)
NEUTROPHILS NFR BLD: 69.9 % (ref 38–73)
NITRITE UR QL STRIP: NEGATIVE
NRBC BLD-RTO: 0 /100 WBC
PH UR STRIP: 7 [PH] (ref 5–8)
PLATELET # BLD AUTO: 213 K/UL (ref 150–350)
PMV BLD AUTO: 12.7 FL (ref 9.2–12.9)
POTASSIUM SERPL-SCNC: 3.7 MMOL/L (ref 3.5–5.1)
PROT 24H UR-MRATE: 240 MG/SPEC (ref 0–100)
PROT SERPL-MCNC: 6.1 G/DL (ref 6–8.4)
PROT UR QL STRIP: NEGATIVE
PROT UR-MCNC: 11 MG/DL (ref 0–15)
PROT UR-MCNC: 8 MG/DL (ref 0–15)
PROT/CREAT UR: 0.25 MG/G{CREAT} (ref 0–0.2)
RBC # BLD AUTO: 4.01 M/UL (ref 4–5.4)
RBC #/AREA URNS HPF: 2 /HPF (ref 0–4)
SARS-COV-2 RDRP RESP QL NAA+PROBE: NEGATIVE
SODIUM SERPL-SCNC: 137 MMOL/L (ref 136–145)
SP GR UR STRIP: 1.02 (ref 1–1.03)
SQUAMOUS #/AREA URNS HPF: 5 /HPF
URATE SERPL-MCNC: 3.6 MG/DL (ref 2.4–5.7)
URINE COLLECTION DURATION: 24 HR
URINE COLLECTION DURATION: 24 HR
URINE VOLUME: 3000 ML
URINE VOLUME: 3000 ML
URN SPEC COLLECT METH UR: ABNORMAL
UROBILINOGEN UR STRIP-ACNC: NEGATIVE EU/DL
WBC # BLD AUTO: 10.25 K/UL (ref 3.9–12.7)
WBC #/AREA URNS HPF: 10 /HPF (ref 0–5)

## 2020-09-25 PROCEDURE — 36415 COLL VENOUS BLD VENIPUNCTURE: CPT

## 2020-09-25 PROCEDURE — G0378 HOSPITAL OBSERVATION PER HR: HCPCS

## 2020-09-25 PROCEDURE — 85025 COMPLETE CBC W/AUTO DIFF WBC: CPT

## 2020-09-25 PROCEDURE — U0002 COVID-19 LAB TEST NON-CDC: HCPCS

## 2020-09-25 PROCEDURE — 84550 ASSAY OF BLOOD/URIC ACID: CPT

## 2020-09-25 PROCEDURE — 83615 LACTATE (LD) (LDH) ENZYME: CPT

## 2020-09-25 PROCEDURE — 59025 FETAL NON-STRESS TEST: CPT

## 2020-09-25 PROCEDURE — 86850 RBC ANTIBODY SCREEN: CPT

## 2020-09-25 PROCEDURE — 84156 ASSAY OF PROTEIN URINE: CPT

## 2020-09-25 PROCEDURE — 25000003 PHARM REV CODE 250: Performed by: OBSTETRICS & GYNECOLOGY

## 2020-09-25 PROCEDURE — 82570 ASSAY OF URINE CREATININE: CPT | Mod: 91

## 2020-09-25 PROCEDURE — 84156 ASSAY OF PROTEIN URINE: CPT | Mod: 91

## 2020-09-25 PROCEDURE — 80053 COMPREHEN METABOLIC PANEL: CPT

## 2020-09-25 PROCEDURE — 87086 URINE CULTURE/COLONY COUNT: CPT

## 2020-09-25 PROCEDURE — 81000 URINALYSIS NONAUTO W/SCOPE: CPT

## 2020-09-25 RX ORDER — DIPHENHYDRAMINE HCL 25 MG
25 CAPSULE ORAL EVERY 4 HOURS PRN
Status: DISCONTINUED | OUTPATIENT
Start: 2020-09-25 | End: 2020-09-26 | Stop reason: HOSPADM

## 2020-09-25 RX ORDER — ACETAMINOPHEN 325 MG/1
650 TABLET ORAL EVERY 6 HOURS PRN
Status: DISCONTINUED | OUTPATIENT
Start: 2020-09-25 | End: 2020-09-26 | Stop reason: HOSPADM

## 2020-09-25 RX ORDER — AMOXICILLIN 250 MG
1 CAPSULE ORAL NIGHTLY PRN
Status: DISCONTINUED | OUTPATIENT
Start: 2020-09-25 | End: 2020-09-26 | Stop reason: HOSPADM

## 2020-09-25 RX ORDER — LABETALOL 100 MG/1
200 TABLET, FILM COATED ORAL 2 TIMES DAILY
Status: DISCONTINUED | OUTPATIENT
Start: 2020-09-25 | End: 2020-09-26 | Stop reason: HOSPADM

## 2020-09-25 RX ORDER — DIPHENHYDRAMINE HYDROCHLORIDE 50 MG/ML
25 INJECTION INTRAMUSCULAR; INTRAVENOUS EVERY 4 HOURS PRN
Status: DISCONTINUED | OUTPATIENT
Start: 2020-09-25 | End: 2020-09-26 | Stop reason: HOSPADM

## 2020-09-25 RX ORDER — SIMETHICONE 80 MG
1 TABLET,CHEWABLE ORAL EVERY 6 HOURS PRN
Status: DISCONTINUED | OUTPATIENT
Start: 2020-09-25 | End: 2020-09-26 | Stop reason: HOSPADM

## 2020-09-25 RX ORDER — PRENATAL WITH FERROUS FUM AND FOLIC ACID 3080; 920; 120; 400; 22; 1.84; 3; 20; 10; 1; 12; 200; 27; 25; 2 [IU]/1; [IU]/1; MG/1; [IU]/1; MG/1; MG/1; MG/1; MG/1; MG/1; MG/1; UG/1; MG/1; MG/1; MG/1; MG/1
1 TABLET ORAL DAILY
Status: DISCONTINUED | OUTPATIENT
Start: 2020-09-25 | End: 2020-09-26 | Stop reason: HOSPADM

## 2020-09-25 RX ORDER — ONDANSETRON 4 MG/1
8 TABLET, ORALLY DISINTEGRATING ORAL EVERY 8 HOURS PRN
Status: DISCONTINUED | OUTPATIENT
Start: 2020-09-25 | End: 2020-09-26 | Stop reason: HOSPADM

## 2020-09-25 RX ADMIN — LABETALOL HYDROCHLORIDE 200 MG: 100 TABLET, FILM COATED ORAL at 03:09

## 2020-09-25 NOTE — NURSING
Patient transferred ambulatory to postpartum room 153 per MD order. Patient instructed on collecting 24 hour urine and keeping it on ice. Notified patient that we will be doing NSTs BID. Patient denies feeling any contractions at this time. Told her to notify RN if she begins feeling contractions or develops a headache, blurred vision, spots in her eyes, or epigastric pain. Will continue to monitor.

## 2020-09-26 VITALS
HEIGHT: 65 IN | WEIGHT: 196 LBS | TEMPERATURE: 98 F | RESPIRATION RATE: 16 BRPM | HEART RATE: 88 BPM | DIASTOLIC BLOOD PRESSURE: 85 MMHG | SYSTOLIC BLOOD PRESSURE: 140 MMHG | OXYGEN SATURATION: 96 % | BODY MASS INDEX: 32.65 KG/M2

## 2020-09-26 LAB
PROT 24H UR-MRATE: 240 MG/SPEC (ref 0–100)
PROT UR-MCNC: 8 MG/DL (ref 0–15)
URINE COLLECTION DURATION: 24 HR
URINE VOLUME: 3000 ML

## 2020-09-26 PROCEDURE — 25000003 PHARM REV CODE 250: Performed by: NURSE PRACTITIONER

## 2020-09-26 PROCEDURE — 25000003 PHARM REV CODE 250: Performed by: OBSTETRICS & GYNECOLOGY

## 2020-09-26 PROCEDURE — G0378 HOSPITAL OBSERVATION PER HR: HCPCS

## 2020-09-26 PROCEDURE — 59025 FETAL NON-STRESS TEST: CPT

## 2020-09-26 PROCEDURE — 84156 ASSAY OF PROTEIN URINE: CPT

## 2020-09-26 RX ORDER — BUTALBITAL, ACETAMINOPHEN AND CAFFEINE 50; 325; 40 MG/1; MG/1; MG/1
2 TABLET ORAL ONCE
Status: COMPLETED | OUTPATIENT
Start: 2020-09-26 | End: 2020-09-26

## 2020-09-26 RX ADMIN — ACETAMINOPHEN 650 MG: 325 TABLET ORAL at 04:09

## 2020-09-26 RX ADMIN — LABETALOL HYDROCHLORIDE 200 MG: 100 TABLET, FILM COATED ORAL at 04:09

## 2020-09-26 RX ADMIN — BUTALBITAL, ACETAMINOPHEN, AND CAFFEINE 2 TABLET: 50; 325; 40 TABLET ORAL at 11:09

## 2020-09-26 RX ADMIN — PRENATAL VITAMINS-IRON FUMARATE 27 MG IRON-FOLIC ACID 0.8 MG TABLET 1 TABLET: at 08:09

## 2020-09-26 NOTE — PROGRESS NOTES
[facility] - Post Partum  Obstetrics  Antepartum Progress Note    Patient Name: Linda Hart  MRN: [id number]  Admission Date: 2020  Hospital Length of Stay: 0 days  Attending Physician: Francisco Chery MD  Primary Care Provider: Primary Doctor No    Subjective:     Principal Problem: elevated blood pressure  HPI: 35 yo  at 36w4d with elevated blood pressures in the clinic.  Was put on observation for 24 hour urine collection, has migraine today not relieved with tylenol.  Pt reports occ migraine, no scotomata, no blurry vision, noruq tenderness, no increased swelling in face and hands.    Obstetric HPI:  Patient reports None contractions, active fetal movement, absent vaginal bleeding , absent loss of fluid      Objective:     Vital Signs (Most Recent):  Temp: 98.2 °F (36.8 °C) (20)  Pulse: 84 (20)  Resp: 16 (20)  BP: 133/77 (20)  SpO2: 97 % (20) Vital Signs (24h Range):  Temp:  [98 °F (36.7 °C)-98.5 °F (36.9 °C)] 98.2 °F (36.8 °C)  Pulse:  [] 84  Resp:  [16-18] 16  SpO2:  [95 %-99 %] 97 %  BP: (113-161)/() 133/77     Weight: 88.9 kg (196 lb)  Body mass index is 32.62 kg/m².    FHT: 130s Cat 1 (reassuring)  TOCO:  Q 0 minutes    No intake or output data in the 24 hours ending 20 1033    Physical Exam     Lungs clear  Cor rrr  abd soft nt  extrem no c/c/e    Cervical Exam:  Dilation:  0   Effacement:  100%  Station: -3  Presentation: Vertex     Significant Labs:  Recent Lab Results       20  1325   20  1315   20  1312        Albumin 2.6         Alkaline Phosphatase 168         ALT 21         Anion Gap 10         Appearance, UA     Clear     AST 28         Bacteria, UA     Moderate     Baso # 0.02         Basophil% 0.2         Bilirubin (UA)     Negative     BILIRUBIN TOTAL 0.4  Comment:  For infants and newborns, interpretation of results should be based  on gestational age, weight and in  agreement with clinical  observations.  Premature Infant recommended reference ranges:  Up to 24 hours.............<8.0 mg/dL  Up to 48 hours............<12.0 mg/dL  3-5 days..................<15.0 mg/dL  6-29 days.................<15.0 mg/dL           BUN, Bld 5         Calcium 8.4         Chloride 106         CO2 21         Color, UA     Yellow     Creatinine 0.6         Creatinine, Random Ur     44.1  Comment:  The random urine reference ranges provided were established   for 24 hour urine collections.  No reference ranges exist for  random urine specimens.  Correlate clinically.       Differential Method Automated         eGFR if  >60.0         eGFR if non  >60.0  Comment:  Calculation used to obtain the estimated glomerular filtration  rate (eGFR) is the CKD-EPI equation.            Eos # 0.1         Eosinophil% 0.7         Glucose 74         Glucose, UA     Negative     Gran # (ANC) 7.2         Gran% 69.9         Group & Rh O POS         Hematocrit 33.0         Hemoglobin 10.7         Immature Grans (Abs) 0.13  Comment:  Mild elevation in immature granulocytes is non specific and   can be seen in a variety of conditions including stress response,   acute inflammation, trauma and pregnancy. Correlation with other   laboratory and clinical findings is essential.           Immature Granulocytes 1.3         Indirect Baljinder GEL NEG         Ketones, UA     Negative       Comment:  Results are increased in hemolyzed samples.         Leukocytes, UA     2+     Lymph # 1.9         Lymph% 18.9         MCH 26.7         MCHC 32.4         MCV 82         Microscopic Comment     SEE COMMENT  Comment:  Other formed elements not mentioned in the report are not   present in the microscopic examination.        Mono # 0.9         Mono% 9.0         MPV 12.7         NITRITE UA     Negative     nRBC 0         Occult Blood UA     Negative     pH, UA     7.0     Platelets 213         Potassium 3.7          Prot/Creat Ratio, Ur     0.25     PROTEIN TOTAL 6.1         Protein, UA     Negative  Comment:  Recommend a 24 hour urine protein or a urine   protein/creatinine ratio if globulin induced proteinuria is  clinically suspected.       Protein, Urine Random     11  Comment:  The random urine reference ranges provided were established   for 24 hour urine collections.  No reference ranges exist for  random urine specimens.  Correlate clinically.       RBC 4.01         RBC, UA     2     RDW 15.0         SARS-CoV-2 RNA, Amplification, Qual   Negative  Comment:  This test utilizes isothermal nucleic acid amplification   technology to detect the SARS-CoV-2 RdRp nucleic acid segment.   The analytical sensitivity (limit of detection) is 125 genome   equivalents/mL.   A POSITIVE result implies infection with the SARS-CoV-2 virus;  the patient is presumed to be contagious.    A NEGATIVE result means that SARS-CoV-2 nucleic acids are not  present above the limit of detection. A NEGATIVE result should be   treated as presumptive. It does not rule out the possibility of   COVID-19 and should not be the sole basis for treatment decisions.   If COVID-19 is strongly suspected based on clinical and exposure   history, re-testing using an alternate molecular assay should be   considered.   This test is only for use under the Food and Drug   Administration s Emergency Use Authorization (EUA).   Commercial kits are provided by [employer].   Performance characteristics of the EUA have been independently  verified by [facility] Department of  Pathology and Laboratory Medicine.   _________________________________________________________________  The ID NOW COVID-19 Letter of Authorization, along with the   authorized Fact Sheet for Healthcare Providers, the authorized Fact  Sheet for Patients, and authorized labeling are available on the FDA    website:  www.fda.gov/MedicalDevices/Safety/EmergencySituations/dfz909004.htm         Sodium 137         Specific Gravity, UA     1.020     Specimen UA     Urine, Clean Catch     Squam Epithel, UA     5     Uric Acid 3.6         UROBILINOGEN UA     Negative     WBC, UA     10     WBC 10.25               Assessment/Plan:     34 y.o. female  at 36w4d for: elevated BP, r/o pre eclampsia, labs all normal, awaiting 24 hour urine results  Migraine HA  Fetal status reassuring, NST reactive      Active Diagnoses:    Diagnosis Date Noted POA    Chronic hypertension with exacerbation during pregnancy in third trimester [O10.913] 2020 Yes      Problems Resolved During this Admission:       Plan  24 urine.  Induction after 37 weeks if mild preeclampsia, anticipate d/c home if urine is ok, pre eclampsia precautions given, f/u with primary ob Monday, fioricet for the HA, labor precautions given, return with worsening HA, blurry vision, RUQ tenderness, increased swelling, bedrest until delivery.  Continue labetelol 200mg po bid        Goldie Delgado MD  Obstetrics  [facility] - Post Partum

## 2020-09-26 NOTE — NURSING
Patient provided with discharge instructions. Instructed her to take labetalol 200mg BID, check blood pressures twice daily, and return to the office on Monday for a follow-up appointment.Educated her on signs and symptoms of pre-eclampsia, how to recognize when she is in labor, and when to return to the hospital. Patient verbalized understanding. She denies any further questions or concerns at this time.

## 2020-09-26 NOTE — NURSING
Dr. Delgado at bedside evaluating patient. Per MD, we will wait for results of 24 hour urine and then determine if patient will go home. If patient is discharged, orders to call in Labetalol 200mg BID (#30) to patient's pharmacy. MD reviewed fetal strip at bedside. Orders given to take patient off the monitor. Also orders to give patient Fioricet 2 tablets once and then 1 tablet every 4 hours as needed for migraine. MD verified order prior to me signing it on Epic.  Will continue to monitor.

## 2020-09-26 NOTE — NURSING
Dr. Delgado called and notified of patient's 24 hour urine results. Orders to discharge patient home with Labetalol 200mg BID (#30). Will call in to [facility] in [place] per patient's request. Informed MD that patient's migraine persists. Instructed to have patient follow up with Dr. Castillo in the office on Monday 9/28.

## 2020-09-26 NOTE — NURSING
Patient discharged via wheelchair to private vehicle with all personal belongings. Patient is in stable condition at the time of discharge. Denies any further questions or concerns. Provided her with phone number to labor and delivery unit in the event she has any questions.

## 2020-09-27 LAB
BACTERIA UR CULT: NORMAL
BACTERIA UR CULT: NORMAL

## 2020-10-04 ENCOUNTER — HOSPITAL ENCOUNTER (INPATIENT)
Facility: HOSPITAL | Age: 35
LOS: 1 days | Discharge: HOME | End: 2020-10-05
Attending: OBSTETRICS & GYNECOLOGY | Admitting: OBSTETRICS & GYNECOLOGY
Payer: COMMERCIAL

## 2020-10-04 DIAGNOSIS — O42.90 LEAKAGE OF AMNIOTIC FLUID: Primary | ICD-10-CM

## 2020-10-04 DIAGNOSIS — Z28.39 RUBELLA NON-IMMUNE STATUS, ANTEPARTUM: ICD-10-CM

## 2020-10-04 DIAGNOSIS — O10.913 CHRONIC HYPERTENSION WITH EXACERBATION DURING PREGNANCY IN THIRD TRIMESTER: ICD-10-CM

## 2020-10-04 DIAGNOSIS — Z87.59 HISTORY OF PREGNANCY INDUCED HYPERTENSION: ICD-10-CM

## 2020-10-04 DIAGNOSIS — O10.919 CHRONIC HYPERTENSION AFFECTING PREGNANCY: ICD-10-CM

## 2020-10-04 DIAGNOSIS — O99.820 GROUP B STREPTOCOCCAL CARRIAGE COMPLICATING PREGNANCY: ICD-10-CM

## 2020-10-04 DIAGNOSIS — O09.899 RUBELLA NON-IMMUNE STATUS, ANTEPARTUM: ICD-10-CM

## 2020-10-04 PROBLEM — K43.6 INCARCERATED VENTRAL HERNIA: Status: RESOLVED | Noted: 2020-02-04 | Resolved: 2020-10-04

## 2020-10-04 PROBLEM — J20.6 ACUTE BRONCHITIS DUE TO RHINOVIRUS: Status: RESOLVED | Noted: 2020-08-05 | Resolved: 2020-10-04

## 2020-10-04 PROBLEM — J01.90 ACUTE NON-RECURRENT SINUSITIS: Status: RESOLVED | Noted: 2020-07-29 | Resolved: 2020-10-04

## 2020-10-04 PROBLEM — Z33.1 IUP (INTRAUTERINE PREGNANCY), INCIDENTAL: Status: RESOLVED | Noted: 2020-09-16 | Resolved: 2020-10-04

## 2020-10-04 LAB
ABO + RH BLD: NORMAL
ALBUMIN SERPL BCP-MCNC: 2.5 G/DL (ref 3.5–5.2)
ALP SERPL-CCNC: 190 U/L (ref 55–135)
ALT SERPL W/O P-5'-P-CCNC: 23 U/L (ref 10–44)
AMPHET+METHAMPHET UR QL: NEGATIVE
ANION GAP SERPL CALC-SCNC: 8 MMOL/L (ref 8–16)
AST SERPL-CCNC: 28 U/L (ref 10–40)
BACTERIA #/AREA URNS HPF: NORMAL /HPF
BARBITURATES UR QL SCN>200 NG/ML: NEGATIVE
BASOPHILS # BLD AUTO: 0.02 K/UL (ref 0–0.2)
BASOPHILS NFR BLD: 0.2 % (ref 0–1.9)
BENZODIAZ UR QL SCN>200 NG/ML: NEGATIVE
BILIRUB SERPL-MCNC: 0.3 MG/DL (ref 0.1–1)
BILIRUB UR QL STRIP: NEGATIVE
BLD GP AB SCN CELLS X3 SERPL QL: NORMAL
BUN SERPL-MCNC: 6 MG/DL (ref 6–20)
BZE UR QL SCN: NEGATIVE
CALCIUM SERPL-MCNC: 8.7 MG/DL (ref 8.7–10.5)
CANNABINOIDS UR QL SCN: NEGATIVE
CHLORIDE SERPL-SCNC: 109 MMOL/L (ref 95–110)
CLARITY UR: CLEAR
CO2 SERPL-SCNC: 20 MMOL/L (ref 23–29)
COLOR UR: YELLOW
CREAT SERPL-MCNC: 0.6 MG/DL (ref 0.5–1.4)
CREAT UR-MCNC: 33.4 MG/DL (ref 15–325)
CREAT UR-MCNC: 33.4 MG/DL (ref 15–325)
DIFFERENTIAL METHOD: ABNORMAL
EOSINOPHIL # BLD AUTO: 0.1 K/UL (ref 0–0.5)
EOSINOPHIL NFR BLD: 0.9 % (ref 0–8)
ERYTHROCYTE [DISTWIDTH] IN BLOOD BY AUTOMATED COUNT: 15.8 % (ref 11.5–14.5)
EST. GFR  (AFRICAN AMERICAN): >60 ML/MIN/1.73 M^2
EST. GFR  (NON AFRICAN AMERICAN): >60 ML/MIN/1.73 M^2
GLUCOSE SERPL-MCNC: 89 MG/DL (ref 70–110)
GLUCOSE UR QL STRIP: NEGATIVE
HCT VFR BLD AUTO: 32.8 % (ref 37–48.5)
HGB BLD-MCNC: 10.6 G/DL (ref 12–16)
HGB UR QL STRIP: NEGATIVE
IMM GRANULOCYTES # BLD AUTO: 0.04 K/UL (ref 0–0.04)
IMM GRANULOCYTES NFR BLD AUTO: 0.5 % (ref 0–0.5)
KETONES UR QL STRIP: NEGATIVE
LDH SERPL L TO P-CCNC: 170 U/L (ref 110–260)
LEUKOCYTE ESTERASE UR QL STRIP: ABNORMAL
LYMPHOCYTES # BLD AUTO: 1.7 K/UL (ref 1–4.8)
LYMPHOCYTES NFR BLD: 21.2 % (ref 18–48)
MCH RBC QN AUTO: 26.6 PG (ref 27–31)
MCHC RBC AUTO-ENTMCNC: 32.3 G/DL (ref 32–36)
MCV RBC AUTO: 82 FL (ref 82–98)
MICROSCOPIC COMMENT: NORMAL
MONOCYTES # BLD AUTO: 0.8 K/UL (ref 0.3–1)
MONOCYTES NFR BLD: 10.4 % (ref 4–15)
NEUTROPHILS # BLD AUTO: 5.4 K/UL (ref 1.8–7.7)
NEUTROPHILS NFR BLD: 66.8 % (ref 38–73)
NITRITE UR QL STRIP: NEGATIVE
NRBC BLD-RTO: 0 /100 WBC
OPIATES UR QL SCN: NEGATIVE
PCP UR QL SCN>25 NG/ML: NEGATIVE
PH UR STRIP: 7 [PH] (ref 5–8)
PLATELET # BLD AUTO: 179 K/UL (ref 150–350)
PMV BLD AUTO: 12.6 FL (ref 9.2–12.9)
POTASSIUM SERPL-SCNC: 3.7 MMOL/L (ref 3.5–5.1)
PROT SERPL-MCNC: 5.9 G/DL (ref 6–8.4)
PROT UR QL STRIP: NEGATIVE
PROT UR-MCNC: <6 MG/DL (ref 0–15)
PROT/CREAT UR: NORMAL MG/G{CREAT} (ref 0–0.2)
RBC # BLD AUTO: 3.98 M/UL (ref 4–5.4)
RBC #/AREA URNS HPF: 1 /HPF (ref 0–4)
SARS-COV-2 RDRP RESP QL NAA+PROBE: NEGATIVE
SODIUM SERPL-SCNC: 137 MMOL/L (ref 136–145)
SP GR UR STRIP: 1.01 (ref 1–1.03)
SQUAMOUS #/AREA URNS HPF: 3 /HPF
TOXICOLOGY INFORMATION: NORMAL
URN SPEC COLLECT METH UR: ABNORMAL
UROBILINOGEN UR STRIP-ACNC: NEGATIVE EU/DL
WBC # BLD AUTO: 8.01 K/UL (ref 3.9–12.7)
WBC #/AREA URNS HPF: 4 /HPF (ref 0–5)

## 2020-10-04 PROCEDURE — 84156 ASSAY OF PROTEIN URINE: CPT

## 2020-10-04 PROCEDURE — 80307 DRUG TEST PRSMV CHEM ANLYZR: CPT

## 2020-10-04 PROCEDURE — 85025 COMPLETE CBC W/AUTO DIFF WBC: CPT

## 2020-10-04 PROCEDURE — 83615 LACTATE (LD) (LDH) ENZYME: CPT

## 2020-10-04 PROCEDURE — 11000001 HC ACUTE MED/SURG PRIVATE ROOM

## 2020-10-04 PROCEDURE — 63600175 PHARM REV CODE 636 W HCPCS

## 2020-10-04 PROCEDURE — 63600175 PHARM REV CODE 636 W HCPCS: Performed by: OBSTETRICS & GYNECOLOGY

## 2020-10-04 PROCEDURE — 25000003 PHARM REV CODE 250: Performed by: OBSTETRICS & GYNECOLOGY

## 2020-10-04 PROCEDURE — 72200005 HC VAGINAL DELIVERY LEVEL II

## 2020-10-04 PROCEDURE — 87491 CHLMYD TRACH DNA AMP PROBE: CPT

## 2020-10-04 PROCEDURE — 80053 COMPREHEN METABOLIC PANEL: CPT

## 2020-10-04 PROCEDURE — U0002 COVID-19 LAB TEST NON-CDC: HCPCS

## 2020-10-04 PROCEDURE — 81000 URINALYSIS NONAUTO W/SCOPE: CPT | Mod: 59

## 2020-10-04 PROCEDURE — 36415 COLL VENOUS BLD VENIPUNCTURE: CPT

## 2020-10-04 PROCEDURE — 59025 FETAL NON-STRESS TEST: CPT

## 2020-10-04 PROCEDURE — 86850 RBC ANTIBODY SCREEN: CPT

## 2020-10-04 PROCEDURE — 72100002 HC LABOR CARE, 1ST 8 HOURS

## 2020-10-04 RX ORDER — NALOXONE HCL 0.4 MG/ML
VIAL (ML) INJECTION
Status: COMPLETED
Start: 2020-10-04 | End: 2020-10-04

## 2020-10-04 RX ORDER — CARBOPROST TROMETHAMINE 250 UG/ML
250 INJECTION, SOLUTION INTRAMUSCULAR
Status: DISCONTINUED | OUTPATIENT
Start: 2020-10-04 | End: 2020-10-04

## 2020-10-04 RX ORDER — SODIUM CHLORIDE 9 MG/ML
INJECTION, SOLUTION INTRAVENOUS
Status: DISCONTINUED | OUTPATIENT
Start: 2020-10-04 | End: 2020-10-04

## 2020-10-04 RX ORDER — OXYTOCIN/RINGER'S LACTATE 30/500 ML
95 PLASTIC BAG, INJECTION (ML) INTRAVENOUS ONCE
Status: COMPLETED | OUTPATIENT
Start: 2020-10-04 | End: 2020-10-04

## 2020-10-04 RX ORDER — OXYTOCIN/RINGER'S LACTATE 30/500 ML
2 PLASTIC BAG, INJECTION (ML) INTRAVENOUS CONTINUOUS
Status: DISCONTINUED | OUTPATIENT
Start: 2020-10-04 | End: 2020-10-04

## 2020-10-04 RX ORDER — ONDANSETRON 4 MG/1
8 TABLET, ORALLY DISINTEGRATING ORAL EVERY 8 HOURS PRN
Status: DISCONTINUED | OUTPATIENT
Start: 2020-10-04 | End: 2020-10-05 | Stop reason: HOSPADM

## 2020-10-04 RX ORDER — LIDOCAINE HYDROCHLORIDE 10 MG/ML
INJECTION, SOLUTION EPIDURAL; INFILTRATION; INTRACAUDAL; PERINEURAL
Status: DISCONTINUED
Start: 2020-10-04 | End: 2020-10-04 | Stop reason: WASHOUT

## 2020-10-04 RX ORDER — SIMETHICONE 80 MG
1 TABLET,CHEWABLE ORAL 4 TIMES DAILY PRN
Status: DISCONTINUED | OUTPATIENT
Start: 2020-10-04 | End: 2020-10-04

## 2020-10-04 RX ORDER — ACETAMINOPHEN 325 MG/1
650 TABLET ORAL EVERY 6 HOURS PRN
Status: DISCONTINUED | OUTPATIENT
Start: 2020-10-04 | End: 2020-10-05 | Stop reason: HOSPADM

## 2020-10-04 RX ORDER — MISOPROSTOL 100 UG/1
TABLET ORAL
Status: DISCONTINUED
Start: 2020-10-04 | End: 2020-10-04 | Stop reason: WASHOUT

## 2020-10-04 RX ORDER — ACETAMINOPHEN 325 MG/1
650 TABLET ORAL EVERY 6 HOURS PRN
Status: DISCONTINUED | OUTPATIENT
Start: 2020-10-04 | End: 2020-10-04

## 2020-10-04 RX ORDER — SODIUM CHLORIDE 0.9 % (FLUSH) 0.9 %
3 SYRINGE (ML) INJECTION EVERY 8 HOURS
Status: DISCONTINUED | OUTPATIENT
Start: 2020-10-04 | End: 2020-10-05 | Stop reason: HOSPADM

## 2020-10-04 RX ORDER — KETOROLAC TROMETHAMINE 30 MG/ML
30 INJECTION, SOLUTION INTRAMUSCULAR; INTRAVENOUS EVERY 6 HOURS
Status: COMPLETED | OUTPATIENT
Start: 2020-10-04 | End: 2020-10-05

## 2020-10-04 RX ORDER — DIPHENHYDRAMINE HYDROCHLORIDE 50 MG/ML
25 INJECTION INTRAMUSCULAR; INTRAVENOUS EVERY 4 HOURS PRN
Status: DISCONTINUED | OUTPATIENT
Start: 2020-10-04 | End: 2020-10-05 | Stop reason: HOSPADM

## 2020-10-04 RX ORDER — DIPHENOXYLATE HYDROCHLORIDE AND ATROPINE SULFATE 2.5; .025 MG/1; MG/1
1 TABLET ORAL 4 TIMES DAILY PRN
Status: DISCONTINUED | OUTPATIENT
Start: 2020-10-04 | End: 2020-10-04

## 2020-10-04 RX ORDER — OXYTOCIN/RINGER'S LACTATE 30/500 ML
41.65 PLASTIC BAG, INJECTION (ML) INTRAVENOUS CONTINUOUS
Status: ACTIVE | OUTPATIENT
Start: 2020-10-04 | End: 2020-10-04

## 2020-10-04 RX ORDER — LABETALOL HCL 20 MG/4 ML
20 SYRINGE (ML) INTRAVENOUS ONCE AS NEEDED
Status: DISCONTINUED | OUTPATIENT
Start: 2020-10-04 | End: 2020-10-04

## 2020-10-04 RX ORDER — OXYTOCIN/RINGER'S LACTATE 30/500 ML
PLASTIC BAG, INJECTION (ML) INTRAVENOUS
Status: DISPENSED
Start: 2020-10-04 | End: 2020-10-04

## 2020-10-04 RX ORDER — OXYTOCIN/RINGER'S LACTATE 30/500 ML
95 PLASTIC BAG, INJECTION (ML) INTRAVENOUS ONCE
Status: DISCONTINUED | OUTPATIENT
Start: 2020-10-04 | End: 2020-10-04

## 2020-10-04 RX ORDER — DOCUSATE SODIUM 100 MG/1
200 CAPSULE, LIQUID FILLED ORAL 2 TIMES DAILY PRN
Status: DISCONTINUED | OUTPATIENT
Start: 2020-10-04 | End: 2020-10-05 | Stop reason: HOSPADM

## 2020-10-04 RX ORDER — DIPHENHYDRAMINE HCL 25 MG
25 CAPSULE ORAL EVERY 4 HOURS PRN
Status: DISCONTINUED | OUTPATIENT
Start: 2020-10-04 | End: 2020-10-05 | Stop reason: HOSPADM

## 2020-10-04 RX ORDER — KETOROLAC TROMETHAMINE 30 MG/ML
30 INJECTION, SOLUTION INTRAMUSCULAR; INTRAVENOUS EVERY 6 HOURS
Status: DISCONTINUED | OUTPATIENT
Start: 2020-10-04 | End: 2020-10-04

## 2020-10-04 RX ORDER — SODIUM CHLORIDE, SODIUM LACTATE, POTASSIUM CHLORIDE, CALCIUM CHLORIDE 600; 310; 30; 20 MG/100ML; MG/100ML; MG/100ML; MG/100ML
INJECTION, SOLUTION INTRAVENOUS CONTINUOUS
Status: DISCONTINUED | OUTPATIENT
Start: 2020-10-04 | End: 2020-10-04

## 2020-10-04 RX ORDER — NALOXONE HCL 0.4 MG/ML
0.2 VIAL (ML) INJECTION ONCE
Status: COMPLETED | OUTPATIENT
Start: 2020-10-04 | End: 2020-10-04

## 2020-10-04 RX ORDER — NALOXONE HCL 0.4 MG/ML
0.4 VIAL (ML) INJECTION ONCE
Status: COMPLETED | OUTPATIENT
Start: 2020-10-04 | End: 2020-10-04

## 2020-10-04 RX ORDER — ONDANSETRON 4 MG/1
8 TABLET, ORALLY DISINTEGRATING ORAL EVERY 8 HOURS PRN
Status: DISCONTINUED | OUTPATIENT
Start: 2020-10-04 | End: 2020-10-04

## 2020-10-04 RX ORDER — LABETALOL HCL 20 MG/4 ML
80 SYRINGE (ML) INTRAVENOUS ONCE AS NEEDED
Status: DISCONTINUED | OUTPATIENT
Start: 2020-10-04 | End: 2020-10-04

## 2020-10-04 RX ORDER — LABETALOL HCL 20 MG/4 ML
40 SYRINGE (ML) INTRAVENOUS ONCE AS NEEDED
Status: DISCONTINUED | OUTPATIENT
Start: 2020-10-04 | End: 2020-10-04

## 2020-10-04 RX ORDER — HYDRALAZINE HYDROCHLORIDE 20 MG/ML
10 INJECTION INTRAMUSCULAR; INTRAVENOUS ONCE AS NEEDED
Status: COMPLETED | OUTPATIENT
Start: 2020-10-04 | End: 2020-10-04

## 2020-10-04 RX ORDER — HYDRALAZINE HYDROCHLORIDE 20 MG/ML
5 INJECTION INTRAMUSCULAR; INTRAVENOUS ONCE AS NEEDED
Status: COMPLETED | OUTPATIENT
Start: 2020-10-04 | End: 2020-10-04

## 2020-10-04 RX ORDER — OXYTOCIN/RINGER'S LACTATE 30/500 ML
334 PLASTIC BAG, INJECTION (ML) INTRAVENOUS ONCE
Status: COMPLETED | OUTPATIENT
Start: 2020-10-04 | End: 2020-10-04

## 2020-10-04 RX ORDER — NAPROXEN 250 MG/1
500 TABLET ORAL EVERY 8 HOURS
Status: DISCONTINUED | OUTPATIENT
Start: 2020-10-05 | End: 2020-10-04

## 2020-10-04 RX ORDER — METHYLERGONOVINE MALEATE 0.2 MG/ML
200 INJECTION INTRAVENOUS
Status: DISCONTINUED | OUTPATIENT
Start: 2020-10-04 | End: 2020-10-04

## 2020-10-04 RX ORDER — LABETALOL 100 MG/1
200 TABLET, FILM COATED ORAL EVERY 12 HOURS
Status: DISCONTINUED | OUTPATIENT
Start: 2020-10-04 | End: 2020-10-05 | Stop reason: HOSPADM

## 2020-10-04 RX ORDER — CALCIUM CARBONATE 200(500)MG
500 TABLET,CHEWABLE ORAL 3 TIMES DAILY PRN
Status: DISCONTINUED | OUTPATIENT
Start: 2020-10-04 | End: 2020-10-04

## 2020-10-04 RX ORDER — NAPROXEN 250 MG/1
500 TABLET ORAL EVERY 8 HOURS
Status: DISCONTINUED | OUTPATIENT
Start: 2020-10-05 | End: 2020-10-05 | Stop reason: HOSPADM

## 2020-10-04 RX ORDER — HYDROCODONE BITARTRATE AND ACETAMINOPHEN 7.5; 325 MG/1; MG/1
1 TABLET ORAL EVERY 4 HOURS PRN
Status: DISCONTINUED | OUTPATIENT
Start: 2020-10-04 | End: 2020-10-05 | Stop reason: HOSPADM

## 2020-10-04 RX ORDER — OXYTOCIN 10 [USP'U]/ML
INJECTION, SOLUTION INTRAMUSCULAR; INTRAVENOUS
Status: DISCONTINUED
Start: 2020-10-04 | End: 2020-10-04 | Stop reason: WASHOUT

## 2020-10-04 RX ORDER — HYDROCODONE BITARTRATE AND ACETAMINOPHEN 5; 325 MG/1; MG/1
1 TABLET ORAL EVERY 4 HOURS PRN
Status: DISCONTINUED | OUTPATIENT
Start: 2020-10-04 | End: 2020-10-05 | Stop reason: HOSPADM

## 2020-10-04 RX ORDER — BUTORPHANOL TARTRATE 2 MG/ML
2 INJECTION INTRAMUSCULAR; INTRAVENOUS
Status: DISCONTINUED | OUTPATIENT
Start: 2020-10-04 | End: 2020-10-04

## 2020-10-04 RX ORDER — MISOPROSTOL 100 UG/1
800 TABLET ORAL
Status: DISCONTINUED | OUTPATIENT
Start: 2020-10-04 | End: 2020-10-04

## 2020-10-04 RX ORDER — BUTORPHANOL TARTRATE 2 MG/ML
1 INJECTION INTRAMUSCULAR; INTRAVENOUS
Status: DISCONTINUED | OUTPATIENT
Start: 2020-10-04 | End: 2020-10-04

## 2020-10-04 RX ADMIN — SODIUM CHLORIDE, SODIUM LACTATE, POTASSIUM CHLORIDE, AND CALCIUM CHLORIDE: .6; .31; .03; .02 INJECTION, SOLUTION INTRAVENOUS at 04:10

## 2020-10-04 RX ADMIN — Medication 95 MILLI-UNITS/MIN: at 07:10

## 2020-10-04 RX ADMIN — KETOROLAC TROMETHAMINE 30 MG: 30 INJECTION, SOLUTION INTRAMUSCULAR at 04:10

## 2020-10-04 RX ADMIN — HYDRALAZINE HYDROCHLORIDE 10 MG: 20 INJECTION INTRAMUSCULAR; INTRAVENOUS at 07:10

## 2020-10-04 RX ADMIN — PROMETHAZINE HYDROCHLORIDE 12.5 MG: 25 INJECTION INTRAMUSCULAR; INTRAVENOUS at 05:10

## 2020-10-04 RX ADMIN — BUTORPHANOL TARTRATE 1 MG: 2 INJECTION, SOLUTION INTRAMUSCULAR; INTRAVENOUS at 05:10

## 2020-10-04 RX ADMIN — NALOXONE HYDROCHLORIDE 0.2 MG: 0.4 INJECTION, SOLUTION INTRAMUSCULAR; INTRAVENOUS; SUBCUTANEOUS at 07:10

## 2020-10-04 RX ADMIN — Medication 334 MILLI-UNITS/MIN: at 07:10

## 2020-10-04 RX ADMIN — Medication 0.4 MG: at 07:10

## 2020-10-04 RX ADMIN — HYDRALAZINE HYDROCHLORIDE 5 MG: 20 INJECTION INTRAMUSCULAR; INTRAVENOUS at 06:10

## 2020-10-04 RX ADMIN — ONDANSETRON 8 MG: 4 TABLET, ORALLY DISINTEGRATING ORAL at 04:10

## 2020-10-04 RX ADMIN — LABETALOL HYDROCHLORIDE 200 MG: 100 TABLET, FILM COATED ORAL at 09:10

## 2020-10-04 RX ADMIN — KETOROLAC TROMETHAMINE 30 MG: 30 INJECTION, SOLUTION INTRAMUSCULAR at 10:10

## 2020-10-04 RX ADMIN — AMPICILLIN SODIUM 2 G: 2 INJECTION, POWDER, FOR SOLUTION INTRAMUSCULAR; INTRAVENOUS at 04:10

## 2020-10-04 RX ADMIN — Medication 0.2 MG: at 07:10

## 2020-10-04 RX ADMIN — Medication 2 MILLI-UNITS/MIN: at 04:10

## 2020-10-04 RX ADMIN — NALOXONE HYDROCHLORIDE 0.4 MG: 0.4 INJECTION, SOLUTION INTRAMUSCULAR; INTRAVENOUS; SUBCUTANEOUS at 07:10

## 2020-10-04 NOTE — L&D DELIVERY NOTE
[facility] - Labor and Delivery  Vaginal Delivery   Operative Note    SUMMARY     Normal spontaneous vaginal delivery of live infant, was placed on mothers abdomen for skin to skin and bulb suctioning performed.  Infant delivered position OA over intact perineum.  Nuchal cord: Yes, cord reduced following delivery.    Spontaneous delivery of placenta and IV pitocin given noting good uterine tone.  No lacerations noted.  Patient tolerated delivery well. Sponge needle and lap counted correctly x2.  Apgars:9,9  EBL=50cc  Patient did receive IV hydralazine for blood pressure control just prior to delivery secondary to severe blood pressure readings.  The patient was also given Narcan to reverse her excessive sleepiness due to stadol.    Indications: Leakage of amniotic fluid  Pregnancy complicated by:   Patient Active Problem List   Diagnosis    History of pregnancy induced hypertension    Chronic hypertension affecting pregnancy    Rubella non-immune status, antepartum    Chronic hypertension with exacerbation during pregnancy in third trimester    Leakage of amniotic fluid    Group B streptococcal carriage complicating pregnancy     Admitting GA: 37w5d    This patient has no babies on file.

## 2020-10-04 NOTE — PLAN OF CARE
This patient has been screened for Case Management needs. Based on documentation in medical record/ no needs are anticipated at this time. Case Management/Social Work remains available if a need arises, please enter consult for assistance. For urgent needs contact Case Management Department at [phone].

## 2020-10-04 NOTE — NURSING
Patient transferred to postpartum room 151 via wheelchair with  and all personal belongings. Patient in stable condition at the time of transfer.

## 2020-10-04 NOTE — H&P
[facility] - Labor and Delivery  Obstetrics  History & Physical    Patient Name: Fatimah Hart  MRN: [id number]  Admission Date: 10/4/2020  Primary Care Provider: Primary Doctor No    Subjective:     Principal Problem:Leakage of amniotic fluid    History of Present Illness:  34-year-old  with an IUP at 37-,5/7 weeks presents to Labor and delivery with complaints of spontaneous rupture of membranes.  Patient reports clear fluid.  She is group B strep positive.    Obstetric HPI:  Patient reports Frequency: Every 2-3 minutes contractions, active fetal movement, No vaginal bleeding , Yes loss of fluid     This pregnancy has been complicated by chronic hypertension.  GBS positive.  Rubella nonimmune.  History of gestational hypertension.  Patient was taking a baby aspirin every day.    OB History    Para Term  AB Living   4 2 2 0 1 2   SAB TAB Ectopic Multiple Live Births   1 0 0 0 2      # Outcome Date GA Lbr Maycol/2nd Weight Sex Delivery Anes PTL Lv   4 Current            3 Term 18 38w0d 03:30 / 00:13 3.232 kg (7 lb 2 oz) F Vag-Spont None N QUOC      Name: JOSE, GIRL FATIMAH      Apgar1: 9  Apgar5: 10   2 SAB            1 Term              Past Medical History:   Diagnosis Date    Anemia     Herpes simplex virus (HSV) infection     Hypertension      Past Surgical History:   Procedure Laterality Date    DILATION AND CURETTAGE OF UTERUS      REPAIR OF INCARCERATED VENTRAL HERNIA WITHOUT HISTORY OF PRIOR REPAIR N/A 2020    Procedure: REPAIR, HERNIA, VENTRAL, INCARCERATED, WITHOUT HISTORY OF PRIOR REPAIR;  Surgeon: Amadou Chambers MD;  Location: [facility];  Service: General;  Laterality: N/A;       PTA Medications   Medication Sig    aspirin 81 MG Chew Take 81 mg by mouth once daily.    labetaloL (NORMODYNE) 200 MG tablet TK 1 T PO BID    prenatal vit/iron fum/folic ac (PRENATAL 1+1 ORAL) Take by mouth.    valACYclovir (VALTREX) 500 MG tablet Take 1 tablet (500 mg  total) by mouth once daily.    valACYclovir (VALTREX) 500 MG tablet Take 1 tablet (500 mg total) by mouth once daily.       Review of patient's allergies indicates:  No Known Allergies     Family History     Problem Relation (Age of Onset)    Hyperlipidemia Sister        Tobacco Use    Smoking status: Never Smoker    Smokeless tobacco: Never Used   Substance and Sexual Activity    Alcohol use: No     Frequency: Never    Drug use: No    Sexual activity: Yes     Partners: Male     Birth control/protection: None     Review of Systems   Constitutional: Negative for appetite change and fever.   HENT: Negative for nasal congestion.    Eyes: Negative for visual disturbance.   Respiratory: Negative for cough and shortness of breath.    Cardiovascular: Negative for chest pain, palpitations and leg swelling.   Gastrointestinal: Negative for abdominal pain, constipation, nausea and vomiting.   Genitourinary: Negative for flank pain, frequency, genital sores, urgency, vaginal discharge and vaginal dryness.   Musculoskeletal: Negative for back pain, joint swelling and leg pain.   Integumentary:  Negative for rash, breast skin changes and breast tenderness.   Neurological: Negative for syncope and headaches.   Hematological: Negative for adenopathy. Does not bruise/bleed easily.   Psychiatric/Behavioral: Negative for depression. The patient is not nervous/anxious.    Breast: Negative for skin changes and tenderness     Objective:     Vital Signs (Most Recent):  Temp: 98.6 °F (37 °C) (10/04/20 0400)  Pulse: 89 (10/04/20 0616)  BP: (!) 190/125 (10/04/20 0636)  SpO2: (!) 90 % (10/04/20 0602) Vital Signs (24h Range):  Temp:  [98.6 °F (37 °C)] 98.6 °F (37 °C)  Pulse:  [75-96] 89  SpO2:  [90 %-95 %] 90 %  BP: (157-190)/() 190/125        There is no height or weight on file to calculate BMI.    FHT: 130s Cat 1 (reassuring)  TOCO:  Q 2 minutes    Physical Exam:   Constitutional: She is oriented to person, place, and time. She  appears well-developed and well-nourished.    HENT:   Head: Normocephalic and atraumatic.    Eyes: EOM are normal.    Neck: Normal range of motion and full passive range of motion without pain. Neck supple.    Cardiovascular: Normal rate, regular rhythm and normal heart sounds.     Pulmonary/Chest: Effort normal and breath sounds normal.        Abdominal: Soft. Bowel sounds are normal.             Musculoskeletal: Normal range of motion.       Neurological: She is alert and oriented to person, place, and time. She has normal strength.    Skin: Skin is warm and dry.    Psychiatric: She has a normal mood and affect. Her speech is normal and behavior is normal.     ON ADMIT:  Cervix:  Dilation:  4  Effacement:  100%  Station: -2  Presentation: Vertex   Patient quickly progressed to complete and +2 station.    Significant Labs:  Lab Results   Component Value Date    GROUPTRH O POS 10/04/2020    HEPBSAG Negative 2020       CBC:   Recent Labs   Lab 10/04/20  0455   WBC 8.01   RBC 3.98*   HGB 10.6*   HCT 32.8*      MCV 82   MCH 26.6*   MCHC 32.3     CMP:   Recent Labs   Lab 10/04/20  0454   GLU 89   CALCIUM 8.7   ALBUMIN 2.5*   PROT 5.9*      K 3.7   CO2 20*      BUN 6   CREATININE 0.6   ALKPHOS 190*   ALT 23   AST 28   BILITOT 0.3     Cecilia/Creat Ratio: No results for input(s): UTPCR in the last 48 hours.    I have personallly reviewed all pertinent lab results from the last 24 hours.    Assessment/Plan:     34 y.o. female  at 37w5d for:    Active Diagnoses:    Diagnosis Date Noted POA    PRINCIPAL PROBLEM:  Leakage of amniotic fluid [O42.90] 10/04/2020 Yes    Group B streptococcal carriage complicating pregnancy [O99.820] 10/04/2020 Not Applicable    Chronic hypertension with exacerbation during pregnancy in third trimester [O10.913] 2020 Yes    Rubella non-immune status, antepartum [O99.891, Z28.3] 2020 Not Applicable    Chronic hypertension affecting pregnancy [O10.919]  03/31/2020 Yes      Problems Resolved During this Admission:       IV ampicillin for GBS prophylaxis.  IV meds for BP control.    Francisco Chery MD  Obstetrics  [facility] - Labor and Delivery

## 2020-10-05 VITALS
TEMPERATURE: 99 F | OXYGEN SATURATION: 97 % | BODY MASS INDEX: 31.82 KG/M2 | HEIGHT: 65 IN | DIASTOLIC BLOOD PRESSURE: 81 MMHG | RESPIRATION RATE: 18 BRPM | HEART RATE: 87 BPM | WEIGHT: 191 LBS | SYSTOLIC BLOOD PRESSURE: 146 MMHG

## 2020-10-05 LAB
ALBUMIN SERPL BCP-MCNC: 2.3 G/DL (ref 3.5–5.2)
ALP SERPL-CCNC: 154 U/L (ref 55–135)
ALT SERPL W/O P-5'-P-CCNC: 20 U/L (ref 10–44)
ANION GAP SERPL CALC-SCNC: 8 MMOL/L (ref 8–16)
AST SERPL-CCNC: 28 U/L (ref 10–40)
BASOPHILS # BLD AUTO: 0.03 K/UL (ref 0–0.2)
BASOPHILS NFR BLD: 0.3 % (ref 0–1.9)
BILIRUB SERPL-MCNC: 0.5 MG/DL (ref 0.1–1)
BUN SERPL-MCNC: 8 MG/DL (ref 6–20)
CALCIUM SERPL-MCNC: 8.2 MG/DL (ref 8.7–10.5)
CHLORIDE SERPL-SCNC: 108 MMOL/L (ref 95–110)
CO2 SERPL-SCNC: 22 MMOL/L (ref 23–29)
CREAT SERPL-MCNC: 0.7 MG/DL (ref 0.5–1.4)
DIFFERENTIAL METHOD: ABNORMAL
EOSINOPHIL # BLD AUTO: 0.1 K/UL (ref 0–0.5)
EOSINOPHIL NFR BLD: 1.2 % (ref 0–8)
ERYTHROCYTE [DISTWIDTH] IN BLOOD BY AUTOMATED COUNT: 15.9 % (ref 11.5–14.5)
EST. GFR  (AFRICAN AMERICAN): >60 ML/MIN/1.73 M^2
EST. GFR  (NON AFRICAN AMERICAN): >60 ML/MIN/1.73 M^2
GLUCOSE SERPL-MCNC: 74 MG/DL (ref 70–110)
HCT VFR BLD AUTO: 31.5 % (ref 37–48.5)
HGB BLD-MCNC: 9.9 G/DL (ref 12–16)
IMM GRANULOCYTES # BLD AUTO: 0.05 K/UL (ref 0–0.04)
IMM GRANULOCYTES NFR BLD AUTO: 0.5 % (ref 0–0.5)
LYMPHOCYTES # BLD AUTO: 2.2 K/UL (ref 1–4.8)
LYMPHOCYTES NFR BLD: 21.6 % (ref 18–48)
MCH RBC QN AUTO: 26.1 PG (ref 27–31)
MCHC RBC AUTO-ENTMCNC: 31.4 G/DL (ref 32–36)
MCV RBC AUTO: 83 FL (ref 82–98)
MONOCYTES # BLD AUTO: 0.8 K/UL (ref 0.3–1)
MONOCYTES NFR BLD: 8.4 % (ref 4–15)
NEUTROPHILS # BLD AUTO: 6.8 K/UL (ref 1.8–7.7)
NEUTROPHILS NFR BLD: 68 % (ref 38–73)
NRBC BLD-RTO: 0 /100 WBC
PLATELET # BLD AUTO: 167 K/UL (ref 150–350)
PMV BLD AUTO: 12.9 FL (ref 9.2–12.9)
POTASSIUM SERPL-SCNC: 4 MMOL/L (ref 3.5–5.1)
PROT SERPL-MCNC: 5.3 G/DL (ref 6–8.4)
RBC # BLD AUTO: 3.8 M/UL (ref 4–5.4)
SODIUM SERPL-SCNC: 138 MMOL/L (ref 136–145)
WBC # BLD AUTO: 9.98 K/UL (ref 3.9–12.7)

## 2020-10-05 PROCEDURE — 25000003 PHARM REV CODE 250: Performed by: OBSTETRICS & GYNECOLOGY

## 2020-10-05 PROCEDURE — 85025 COMPLETE CBC W/AUTO DIFF WBC: CPT

## 2020-10-05 PROCEDURE — 63600175 PHARM REV CODE 636 W HCPCS: Performed by: OBSTETRICS & GYNECOLOGY

## 2020-10-05 PROCEDURE — 80053 COMPREHEN METABOLIC PANEL: CPT

## 2020-10-05 PROCEDURE — 36415 COLL VENOUS BLD VENIPUNCTURE: CPT

## 2020-10-05 RX ORDER — HYDROCODONE BITARTRATE AND ACETAMINOPHEN 5; 325 MG/1; MG/1
1 TABLET ORAL EVERY 4 HOURS PRN
Qty: 14 TABLET | Refills: 0 | Status: SHIPPED | OUTPATIENT
Start: 2020-10-05 | End: 2021-06-17

## 2020-10-05 RX ORDER — DOCUSATE SODIUM 100 MG/1
200 CAPSULE, LIQUID FILLED ORAL 2 TIMES DAILY PRN
Refills: 0
Start: 2020-10-05 | End: 2021-06-17

## 2020-10-05 RX ORDER — NAPROXEN 500 MG/1
500 TABLET ORAL EVERY 8 HOURS
Qty: 30 TABLET | Refills: 1 | Status: SHIPPED | OUTPATIENT
Start: 2020-10-05 | End: 2021-06-17

## 2020-10-05 RX ORDER — LABETALOL 200 MG/1
200 TABLET, FILM COATED ORAL EVERY 12 HOURS
Qty: 60 TABLET | Refills: 11 | Status: SHIPPED | OUTPATIENT
Start: 2020-10-05 | End: 2022-07-26 | Stop reason: SDUPTHER

## 2020-10-05 RX ADMIN — NAPROXEN 500 MG: 250 TABLET ORAL at 06:10

## 2020-10-05 RX ADMIN — LABETALOL HYDROCHLORIDE 200 MG: 100 TABLET, FILM COATED ORAL at 08:10

## 2020-10-05 RX ADMIN — KETOROLAC TROMETHAMINE 30 MG: 30 INJECTION, SOLUTION INTRAMUSCULAR at 12:10

## 2020-10-05 NOTE — NURSING
DC TEACHING AND INSTRUCTIONS REVIEWED, DENIES ANY QUESTIONS AND CONCERNS.  VERB UNDERSTANDING OF F/U APPTS.

## 2020-10-05 NOTE — DISCHARGE INSTRUCTIONS
No heavy lifting over 20 lbs.     No sex, tampons, enemas or douching for 6 weeks.      Make sure to rest frequently    Monitor your bleeding, should be as heavy as a normal period and continue to lighten until it stops, will possibly bleed for several weeks, change pad frequently    Notify your doctor if you have any of the following symptoms: Fever > 100.4, heavy vaginal bleeding especially with large clots, excessive pain, pain with urination, pain, swelling or redness in your calves or suddenly become short of breath or any concerns that you may have.     Continue to take your medications as prescribed.    You can buy OTC stool softeners (Colace) for prevention of constipation. If colace doesn't work you may also purchase Miralax over the counter to help with constipation.    Increase your fluid intake. Increase intake of vegetables, fruit and fiber into your diet.    You may apply ice pack to perineum as needed.    You can continue to use OTC Dermoplast spray and witch hazel pads for perineum pain. You can buy more of these at most stores in the pharmacy area.     You may shower or tub bathe.     You may perform sitz baths or continue to use liliana-bottle at home    [facility] [phone]    [facility] dept [phone]

## 2020-10-05 NOTE — PLAN OF CARE
10/05/20 1040   Final Note   Assessment Type Final Discharge Note   Anticipated Discharge Disposition Home   What phone number can be called within the next 1-3 days to see how you are doing after discharge? [phone]   Hospital Follow Up  Appt(s) scheduled? Yes   Discharge plans and expectations educations in teach back method with documentation complete? Yes   Verbal & written follow up appointments with Dr Chery & Vida Urena NP pediatrics provided to patient. Demonstrated understanding by verbal feedback. Denies any other needs at this time.

## 2020-10-05 NOTE — NURSING
EOSS protocols ongoing and effective. V/S/S no distress noted. Fundus firm lochia moderate. Bonding well and breastfeeding baby well. Pt in stable post partum condition. Report to oncoming shift at change of shift.

## 2020-10-05 NOTE — NURSING
Pt requested to take shower, piv vaishnavi'd at this time, discussed  care w/ mother for 24 hr checks, wishes to send baby to nursery while taking a shower.  No family present in room at this time.  Towels, soap, and lotion provided to pt upon request.

## 2020-10-09 NOTE — PHYSICIAN QUERY
"  PT Name: Linda Hart  MR #: [id number]     OB HSV CLARIFICATION     CDS/: JOANNE Guerrero,RNC-MNN        Contact information:[email or web address]  This form is a permanent document in the medical record.    Query Date: October 9, 2020  By submitting this query, we are merely seeking further clarification of documentation. Please utilize your independent clinical judgment when addressing the question(s) below.        Indicators Supporting Clinical Findings Location in Medical Record   X Documentation of "HSV" Herpes simplex virus (HSV) infection H&P 10/4   X Genitourinary Exam/Sterile Speculum Exam Genitourinary: Negative for flank pain, frequency, genital sores, urgency, vaginal discharge and vaginal dryness H&P 10/4   X Delivery Type Normal spontaneous vaginal delivery of live infant L&D Delivery note 10/4   X Medications  Treatment valACYclovir (VALTREX) 500 MG tablet  Take 1 tablet (500 mg total) by mouth once daily H&P 10/4    Other       Provider, please specify the diagnosis or diagnoses associated with the above clinical findings:  [   ] Genital, unspecified   [   ] Oral   [   ] Vulva   [   ] Vagina   [   ] Labia   [   ] Cervix   [   ] Anus (perianal skin) and/or Rectum   [ x  ] Other (please specify): _seropositive_____________   [  ] Clinically undetermined           Please document in your progress notes daily for the duration of treatment, until resolved, and include in your discharge summary.      "

## 2020-10-12 NOTE — DISCHARGE SUMMARY
[facility] - Post Partum  Obstetrics  Discharge Summary      Patient Name: Linda Hart  MRN: [id number]  Admission Date: 10/4/2020  Hospital Length of Stay: 1 days  Discharge Date and Time: 10/5/2020  1:08 PM  Attending Physician: No att. providers found   Discharging Provider: Francisco Chery MD  Primary Care Provider: Primary Doctor No    HPI: 34-year-old  with an IUP at 37-,5/7 weeks presents to Labor and delivery with complaints of spontaneous rupture of membranes.  Patient reports clear fluid.  She is group B strep positive.     Obstetric HPI:  Patient reports Frequency: Every 2-3 minutes contractions, active fetal movement, No vaginal bleeding , Yes loss of fluid      This pregnancy has been complicated by chronic hypertension.  GBS positive.  Rubella nonimmune.  History of gestational hypertension.  Patient was taking a baby aspirin every day.    * No surgery found *     Hospital Course:  Patient had an  of a viable infant.  Blood pressures remained stable after delivery.  Patient requested discharge on postpartum day 1.        Final Active Diagnoses:    Diagnosis Date Noted POA    PRINCIPAL PROBLEM:  Leakage of amniotic fluid [O42.90] 10/04/2020 Yes    Group B streptococcal carriage complicating pregnancy [O99.820] 10/04/2020 Not Applicable    Chronic hypertension with exacerbation during pregnancy in third trimester [O10.913] 2020 Yes    Rubella non-immune status, antepartum [O99.891, Z28.3] 2020 Not Applicable    Chronic hypertension affecting pregnancy [O10.919] 2020 Yes    History of pregnancy induced hypertension [Z87.59] 2020 Not Applicable      Problems Resolved During this Admission:        Labs: CBC No results for input(s): WBC, HGB, HCT, PLT in the last 48 hours. and All labs within the past 24 hours have been reviewed    Feeding Method: both breast and bottle    Immunizations     None          Delivery:    Episiotomy: None    Lacerations: None   Repair suture: None   Repair # of packets:     Blood loss (ml):       Birth information:  YOB: 2018   Time of birth: 7:05 AM   Sex: female   Delivery type: Vaginal, Spontaneous   Gestational Age: 37w5d    Delivery Clinician:      Other providers:       Additional  information:  Forceps:    Vacuum:    Breech:    Observed anomalies      Living?:           APGARS  One minute Five minutes Ten minutes   Skin color:         Heart rate:         Grimace:         Muscle tone:         Breathing:         Totals: 9  9        Placenta: Delivered:       appearance    Pending Diagnostic Studies:     None          Discharged Condition: good    Disposition: Home or Self Care    Follow Up:  Follow-up Information     Komal Castillo MD. Go on 10/12/2020.    Specialty: Obstetrics and Gynecology  Why: appointment time: Monday Oct 12th at 9:30am for BP check  Contact information:  [address] [facility] [address]  [phone]             Vida Favre Ayanna, NP. Go on 10/7/2020.    Why: appointment time: Wed Oct 7th at 9:30am for  follow up  Contact information:  [address] [place] [address]  [phone]               Patient Instructions:      Other restrictions (specify):   Order Comments: Pelvic rest: no sex tampons or douching     Notify your health care provider if you experience any of the following:  temperature >100.4     Notify your health care provider if you experience any of the following:  persistent nausea and vomiting or diarrhea     Notify your health care provider if you experience any of the following:  severe persistent headache     Notify your health care provider if you experience any of the following:  persistent dizziness, light-headedness, or visual disturbances     Notify your health care provider if you experience any of the following:   Order Comments: 1.  Unusual vaginal discharge.  2.  Pain or burning with urination  3.  Swelling of face,  hands, legs,etc that occurs suddenly     Medications:  Discharge Medication List as of 10/5/2020 11:36 AM      START taking these medications    Details   benzocaine-lanolin (DERMOPLAST) 20-0.5 % Aero Apply topically continuous prn., Starting Mon 10/5/2020, OTC      docusate sodium (COLACE) 100 MG capsule Take 2 capsules (200 mg total) by mouth 2 (two) times daily as needed for Constipation., Starting Mon 10/5/2020, No Print      HYDROcodone-acetaminophen (NORCO) 5-325 mg per tablet Take 1 tablet by mouth every 4 (four) hours as needed., Starting Mon 10/5/2020, Print      naproxen (NAPROSYN) 500 MG tablet Take 1 tablet (500 mg total) by mouth every 8 (eight) hours., Starting Mon 10/5/2020, Normal         CONTINUE these medications which have CHANGED    Details   !! labetaloL (NORMODYNE) 200 MG tablet Take 1 tablet (200 mg total) by mouth every 12 (twelve) hours., Starting Mon 10/5/2020, Until Tue 10/5/2021, Normal       !! - Potential duplicate medications found. Please discuss with provider.      CONTINUE these medications which have NOT CHANGED    Details   !! labetaloL (NORMODYNE) 200 MG tablet TK 1 T PO BID, Historical Med      prenatal vit/iron fum/folic ac (PRENATAL 1+1 ORAL) Take by mouth., Historical Med       !! - Potential duplicate medications found. Please discuss with provider.      STOP taking these medications       aspirin 81 MG Chew Comments:   Reason for Stopping:         valACYclovir (VALTREX) 500 MG tablet Comments:   Reason for Stopping:         valACYclovir (VALTREX) 500 MG tablet Comments:   Reason for Stopping:               Francisco Chery MD  Obstetrics  [facility] - Post Partum

## 2020-10-25 ENCOUNTER — PATIENT MESSAGE (OUTPATIENT)
Dept: ADMINISTRATIVE | Facility: OTHER | Age: 35
End: 2020-10-25

## 2021-06-08 ENCOUNTER — HOSPITAL ENCOUNTER (EMERGENCY)
Facility: HOSPITAL | Age: 36
Discharge: HOME | End: 2021-06-08
Attending: EMERGENCY MEDICINE
Payer: COMMERCIAL

## 2021-06-08 VITALS
HEART RATE: 60 BPM | DIASTOLIC BLOOD PRESSURE: 93 MMHG | BODY MASS INDEX: 29.99 KG/M2 | TEMPERATURE: 98 F | HEIGHT: 65 IN | OXYGEN SATURATION: 97 % | RESPIRATION RATE: 18 BRPM | SYSTOLIC BLOOD PRESSURE: 167 MMHG | WEIGHT: 180 LBS

## 2021-06-08 DIAGNOSIS — R51.9 NONINTRACTABLE HEADACHE, UNSPECIFIED CHRONICITY PATTERN, UNSPECIFIED HEADACHE TYPE: Primary | ICD-10-CM

## 2021-06-08 LAB
ALBUMIN SERPL BCP-MCNC: 4.6 G/DL (ref 3.5–5.2)
ALP SERPL-CCNC: 94 U/L (ref 55–135)
ALT SERPL W/O P-5'-P-CCNC: 26 U/L (ref 10–44)
ANION GAP SERPL CALC-SCNC: 12 MMOL/L (ref 8–16)
AST SERPL-CCNC: 29 U/L (ref 10–40)
B-HCG UR QL: NEGATIVE
BASOPHILS # BLD AUTO: 0.04 K/UL (ref 0–0.2)
BASOPHILS NFR BLD: 0.8 % (ref 0–1.9)
BILIRUB SERPL-MCNC: 0.8 MG/DL (ref 0.1–1)
BUN SERPL-MCNC: 11 MG/DL (ref 6–20)
CALCIUM SERPL-MCNC: 9.6 MG/DL (ref 8.7–10.5)
CHLORIDE SERPL-SCNC: 99 MMOL/L (ref 95–110)
CO2 SERPL-SCNC: 27 MMOL/L (ref 23–29)
CREAT SERPL-MCNC: 0.8 MG/DL (ref 0.5–1.4)
CTP QC/QA: YES
DIFFERENTIAL METHOD: NORMAL
EOSINOPHIL # BLD AUTO: 0 K/UL (ref 0–0.5)
EOSINOPHIL NFR BLD: 0.8 % (ref 0–8)
ERYTHROCYTE [DISTWIDTH] IN BLOOD BY AUTOMATED COUNT: 14.4 % (ref 11.5–14.5)
EST. GFR  (AFRICAN AMERICAN): >60 ML/MIN/1.73 M^2
EST. GFR  (NON AFRICAN AMERICAN): >60 ML/MIN/1.73 M^2
GLUCOSE SERPL-MCNC: 94 MG/DL (ref 70–110)
HCT VFR BLD AUTO: 46.4 % (ref 37–48.5)
HGB BLD-MCNC: 15 G/DL (ref 12–16)
IMM GRANULOCYTES # BLD AUTO: 0.01 K/UL (ref 0–0.04)
IMM GRANULOCYTES NFR BLD AUTO: 0.2 % (ref 0–0.5)
LYMPHOCYTES # BLD AUTO: 1.7 K/UL (ref 1–4.8)
LYMPHOCYTES NFR BLD: 32.9 % (ref 18–48)
MAGNESIUM SERPL-MCNC: 1.9 MG/DL (ref 1.6–2.6)
MCH RBC QN AUTO: 27.9 PG (ref 27–31)
MCHC RBC AUTO-ENTMCNC: 32.3 G/DL (ref 32–36)
MCV RBC AUTO: 86 FL (ref 82–98)
MONOCYTES # BLD AUTO: 0.3 K/UL (ref 0.3–1)
MONOCYTES NFR BLD: 6.1 % (ref 4–15)
NEUTROPHILS # BLD AUTO: 3.1 K/UL (ref 1.8–7.7)
NEUTROPHILS NFR BLD: 59.2 % (ref 38–73)
NRBC BLD-RTO: 0 /100 WBC
PLATELET # BLD AUTO: 275 K/UL (ref 150–450)
PMV BLD AUTO: 10.8 FL (ref 9.2–12.9)
POTASSIUM SERPL-SCNC: 3.5 MMOL/L (ref 3.5–5.1)
PROT SERPL-MCNC: 8.5 G/DL (ref 6–8.4)
RBC # BLD AUTO: 5.38 M/UL (ref 4–5.4)
SODIUM SERPL-SCNC: 138 MMOL/L (ref 136–145)
WBC # BLD AUTO: 5.23 K/UL (ref 3.9–12.7)

## 2021-06-08 PROCEDURE — 99284 EMERGENCY DEPT VISIT MOD MDM: CPT | Mod: 25

## 2021-06-08 PROCEDURE — 25000003 PHARM REV CODE 250: Performed by: EMERGENCY MEDICINE

## 2021-06-08 PROCEDURE — 63600175 PHARM REV CODE 636 W HCPCS: Performed by: EMERGENCY MEDICINE

## 2021-06-08 PROCEDURE — 96374 THER/PROPH/DIAG INJ IV PUSH: CPT

## 2021-06-08 PROCEDURE — 85025 COMPLETE CBC W/AUTO DIFF WBC: CPT | Performed by: EMERGENCY MEDICINE

## 2021-06-08 PROCEDURE — 80053 COMPREHEN METABOLIC PANEL: CPT | Performed by: EMERGENCY MEDICINE

## 2021-06-08 PROCEDURE — 83735 ASSAY OF MAGNESIUM: CPT | Performed by: EMERGENCY MEDICINE

## 2021-06-08 PROCEDURE — 81025 URINE PREGNANCY TEST: CPT | Performed by: EMERGENCY MEDICINE

## 2021-06-08 PROCEDURE — 96375 TX/PRO/DX INJ NEW DRUG ADDON: CPT

## 2021-06-08 PROCEDURE — 96361 HYDRATE IV INFUSION ADD-ON: CPT

## 2021-06-08 RX ORDER — BUTALBITAL, ACETAMINOPHEN AND CAFFEINE 50; 325; 40 MG/1; MG/1; MG/1
1 TABLET ORAL EVERY 4 HOURS PRN
Qty: 10 TABLET | Refills: 0 | Status: SHIPPED | OUTPATIENT
Start: 2021-06-08 | End: 2021-06-17

## 2021-06-08 RX ORDER — DIPHENHYDRAMINE HYDROCHLORIDE 50 MG/ML
25 INJECTION INTRAMUSCULAR; INTRAVENOUS
Status: COMPLETED | OUTPATIENT
Start: 2021-06-08 | End: 2021-06-08

## 2021-06-08 RX ORDER — PROCHLORPERAZINE EDISYLATE 5 MG/ML
10 INJECTION INTRAMUSCULAR; INTRAVENOUS
Status: COMPLETED | OUTPATIENT
Start: 2021-06-08 | End: 2021-06-08

## 2021-06-08 RX ORDER — ONDANSETRON 4 MG/1
4 TABLET, FILM COATED ORAL EVERY 8 HOURS PRN
Qty: 12 TABLET | Refills: 0 | Status: SHIPPED | OUTPATIENT
Start: 2021-06-08 | End: 2021-06-17

## 2021-06-08 RX ORDER — KETOROLAC TROMETHAMINE 30 MG/ML
15 INJECTION, SOLUTION INTRAMUSCULAR; INTRAVENOUS
Status: COMPLETED | OUTPATIENT
Start: 2021-06-08 | End: 2021-06-08

## 2021-06-08 RX ADMIN — PROCHLORPERAZINE EDISYLATE 10 MG: 5 INJECTION INTRAMUSCULAR; INTRAVENOUS at 01:06

## 2021-06-08 RX ADMIN — SODIUM CHLORIDE 1000 ML: 0.9 INJECTION, SOLUTION INTRAVENOUS at 01:06

## 2021-06-08 RX ADMIN — DIPHENHYDRAMINE HYDROCHLORIDE 25 MG: 50 INJECTION INTRAMUSCULAR; INTRAVENOUS at 01:06

## 2021-06-08 RX ADMIN — KETOROLAC TROMETHAMINE 15 MG: 30 INJECTION, SOLUTION INTRAMUSCULAR; INTRAVENOUS at 01:06

## 2021-06-21 ENCOUNTER — LAB VISIT (OUTPATIENT)
Dept: LAB | Facility: HOSPITAL | Age: 36
End: 2021-06-21
Attending: NURSE PRACTITIONER
Payer: COMMERCIAL

## 2021-06-21 DIAGNOSIS — I10 ESSENTIAL HYPERTENSION: ICD-10-CM

## 2021-06-21 LAB
ALBUMIN SERPL BCP-MCNC: 4.2 G/DL (ref 3.5–5.2)
ALP SERPL-CCNC: 89 U/L (ref 55–135)
ALT SERPL W/O P-5'-P-CCNC: 19 U/L (ref 10–44)
ANION GAP SERPL CALC-SCNC: 12 MMOL/L (ref 8–16)
AST SERPL-CCNC: 21 U/L (ref 10–40)
BASOPHILS # BLD AUTO: 0.04 K/UL (ref 0–0.2)
BASOPHILS NFR BLD: 0.7 % (ref 0–1.9)
BILIRUB SERPL-MCNC: 0.4 MG/DL (ref 0.1–1)
BUN SERPL-MCNC: 14 MG/DL (ref 6–20)
CALCIUM SERPL-MCNC: 9.8 MG/DL (ref 8.7–10.5)
CHLORIDE SERPL-SCNC: 103 MMOL/L (ref 95–110)
CHOLEST SERPL-MCNC: 204 MG/DL (ref 120–199)
CHOLEST/HDLC SERPL: 3.6 {RATIO} (ref 2–5)
CO2 SERPL-SCNC: 23 MMOL/L (ref 23–29)
CREAT SERPL-MCNC: 0.9 MG/DL (ref 0.5–1.4)
DIFFERENTIAL METHOD: NORMAL
EOSINOPHIL # BLD AUTO: 0.1 K/UL (ref 0–0.5)
EOSINOPHIL NFR BLD: 0.9 % (ref 0–8)
ERYTHROCYTE [DISTWIDTH] IN BLOOD BY AUTOMATED COUNT: 14.2 % (ref 11.5–14.5)
EST. GFR  (AFRICAN AMERICAN): >60 ML/MIN/1.73 M^2
EST. GFR  (NON AFRICAN AMERICAN): >60 ML/MIN/1.73 M^2
ESTIMATED AVG GLUCOSE: 117 MG/DL (ref 68–131)
GLUCOSE SERPL-MCNC: 90 MG/DL (ref 70–110)
HBA1C MFR BLD: 5.7 % (ref 4–5.6)
HCT VFR BLD AUTO: 42.1 % (ref 37–48.5)
HDLC SERPL-MCNC: 56 MG/DL (ref 40–75)
HDLC SERPL: 27.5 % (ref 20–50)
HGB BLD-MCNC: 13.8 G/DL (ref 12–16)
IMM GRANULOCYTES # BLD AUTO: 0.01 K/UL (ref 0–0.04)
IMM GRANULOCYTES NFR BLD AUTO: 0.2 % (ref 0–0.5)
LDLC SERPL CALC-MCNC: 126.2 MG/DL (ref 63–159)
LYMPHOCYTES # BLD AUTO: 2.5 K/UL (ref 1–4.8)
LYMPHOCYTES NFR BLD: 45.2 % (ref 18–48)
MCH RBC QN AUTO: 28 PG (ref 27–31)
MCHC RBC AUTO-ENTMCNC: 32.8 G/DL (ref 32–36)
MCV RBC AUTO: 86 FL (ref 82–98)
MONOCYTES # BLD AUTO: 0.4 K/UL (ref 0.3–1)
MONOCYTES NFR BLD: 7.5 % (ref 4–15)
NEUTROPHILS # BLD AUTO: 2.6 K/UL (ref 1.8–7.7)
NEUTROPHILS NFR BLD: 45.5 % (ref 38–73)
NONHDLC SERPL-MCNC: 148 MG/DL
NRBC BLD-RTO: 0 /100 WBC
PLATELET # BLD AUTO: 250 K/UL (ref 150–450)
PMV BLD AUTO: 10.5 FL (ref 9.2–12.9)
POTASSIUM SERPL-SCNC: 3.8 MMOL/L (ref 3.5–5.1)
PROT SERPL-MCNC: 8.1 G/DL (ref 6–8.4)
RBC # BLD AUTO: 4.92 M/UL (ref 4–5.4)
SODIUM SERPL-SCNC: 138 MMOL/L (ref 136–145)
TRIGL SERPL-MCNC: 109 MG/DL (ref 30–150)
TSH SERPL DL<=0.005 MIU/L-ACNC: 0.96 UIU/ML (ref 0.4–4)
WBC # BLD AUTO: 5.6 K/UL (ref 3.9–12.7)

## 2021-06-21 PROCEDURE — 36415 COLL VENOUS BLD VENIPUNCTURE: CPT | Performed by: NURSE PRACTITIONER

## 2021-06-21 PROCEDURE — 83036 HEMOGLOBIN GLYCOSYLATED A1C: CPT | Performed by: NURSE PRACTITIONER

## 2021-06-21 PROCEDURE — 80061 LIPID PANEL: CPT | Performed by: NURSE PRACTITIONER

## 2021-06-21 PROCEDURE — 85025 COMPLETE CBC W/AUTO DIFF WBC: CPT | Performed by: NURSE PRACTITIONER

## 2021-06-21 PROCEDURE — 80053 COMPREHEN METABOLIC PANEL: CPT | Performed by: NURSE PRACTITIONER

## 2021-06-21 PROCEDURE — 84443 ASSAY THYROID STIM HORMONE: CPT | Performed by: NURSE PRACTITIONER

## 2021-06-25 ENCOUNTER — OFFICE VISIT (OUTPATIENT)
Dept: CARDIOLOGY | Facility: CLINIC | Age: 36
End: 2021-06-25
Payer: COMMERCIAL

## 2021-06-25 VITALS
OXYGEN SATURATION: 98 % | RESPIRATION RATE: 16 BRPM | HEART RATE: 72 BPM | DIASTOLIC BLOOD PRESSURE: 81 MMHG | SYSTOLIC BLOOD PRESSURE: 112 MMHG | BODY MASS INDEX: 29.62 KG/M2 | WEIGHT: 177.81 LBS | HEIGHT: 65 IN

## 2021-06-25 DIAGNOSIS — I10 ESSENTIAL HYPERTENSION: Primary | ICD-10-CM

## 2021-06-25 DIAGNOSIS — O10.913 CHRONIC HYPERTENSION WITH EXACERBATION DURING PREGNANCY IN THIRD TRIMESTER: ICD-10-CM

## 2021-06-25 DIAGNOSIS — F43.9 STRESS AT HOME: ICD-10-CM

## 2021-06-25 PROCEDURE — 99205 PR OFFICE/OUTPT VISIT, NEW, LEVL V, 60-74 MIN: ICD-10-PCS | Mod: S$GLB,,, | Performed by: INTERNAL MEDICINE

## 2021-06-25 PROCEDURE — 99999 PR PBB SHADOW E&M-EST. PATIENT-LVL IV: CPT | Mod: PBBFAC,,, | Performed by: INTERNAL MEDICINE

## 2021-06-25 PROCEDURE — 99999 PR PBB SHADOW E&M-EST. PATIENT-LVL IV: ICD-10-PCS | Mod: PBBFAC,,, | Performed by: INTERNAL MEDICINE

## 2021-06-25 PROCEDURE — 99205 OFFICE O/P NEW HI 60 MIN: CPT | Mod: S$GLB,,, | Performed by: INTERNAL MEDICINE

## 2021-06-28 ENCOUNTER — OFFICE VISIT (OUTPATIENT)
Dept: NEUROLOGY | Facility: CLINIC | Age: 36
End: 2021-06-28
Payer: COMMERCIAL

## 2021-06-28 VITALS
DIASTOLIC BLOOD PRESSURE: 91 MMHG | SYSTOLIC BLOOD PRESSURE: 136 MMHG | RESPIRATION RATE: 17 BRPM | HEART RATE: 76 BPM | HEIGHT: 65 IN | WEIGHT: 179.44 LBS | BODY MASS INDEX: 29.9 KG/M2

## 2021-06-28 DIAGNOSIS — G43.009 MIGRAINE WITHOUT AURA AND WITHOUT STATUS MIGRAINOSUS, NOT INTRACTABLE: ICD-10-CM

## 2021-06-28 PROCEDURE — 99999 PR PBB SHADOW E&M-EST. PATIENT-LVL IV: CPT | Mod: PBBFAC,,, | Performed by: PHYSICIAN ASSISTANT

## 2021-06-28 PROCEDURE — 99999 PR PBB SHADOW E&M-EST. PATIENT-LVL IV: ICD-10-PCS | Mod: PBBFAC,,, | Performed by: PHYSICIAN ASSISTANT

## 2021-06-28 PROCEDURE — 99204 PR OFFICE/OUTPT VISIT, NEW, LEVL IV, 45-59 MIN: ICD-10-PCS | Mod: S$GLB,,, | Performed by: PHYSICIAN ASSISTANT

## 2021-06-28 PROCEDURE — 99204 OFFICE O/P NEW MOD 45 MIN: CPT | Mod: S$GLB,,, | Performed by: PHYSICIAN ASSISTANT

## 2021-06-28 RX ORDER — SUMATRIPTAN SUCCINATE 100 MG/1
TABLET ORAL
Qty: 12 TABLET | Refills: 4 | Status: SHIPPED | OUTPATIENT
Start: 2021-06-28 | End: 2022-08-11

## 2021-08-27 ENCOUNTER — TELEPHONE (OUTPATIENT)
Dept: NEUROLOGY | Facility: CLINIC | Age: 36
End: 2021-08-27

## 2021-09-08 ENCOUNTER — TELEPHONE (OUTPATIENT)
Dept: NEUROLOGY | Facility: CLINIC | Age: 36
End: 2021-09-08

## 2021-11-18 RX ORDER — SUMATRIPTAN SUCCINATE 100 MG/1
TABLET ORAL
Qty: 12 TABLET | Refills: 4 | OUTPATIENT
Start: 2021-11-18

## 2021-11-26 ENCOUNTER — IMMUNIZATION (OUTPATIENT)
Dept: FAMILY MEDICINE | Facility: CLINIC | Age: 36
End: 2021-11-26
Payer: COMMERCIAL

## 2021-11-26 DIAGNOSIS — Z23 NEED FOR VACCINATION: Primary | ICD-10-CM

## 2021-11-26 PROCEDURE — 0001A COVID-19, MRNA, LNP-S, PF, 30 MCG/0.3 ML DOSE VACCINE: CPT | Mod: PBBFAC | Performed by: FAMILY MEDICINE

## 2021-12-17 ENCOUNTER — IMMUNIZATION (OUTPATIENT)
Dept: FAMILY MEDICINE | Facility: CLINIC | Age: 36
End: 2021-12-17
Payer: COMMERCIAL

## 2021-12-17 DIAGNOSIS — Z23 NEED FOR VACCINATION: Primary | ICD-10-CM

## 2021-12-17 PROCEDURE — 0002A COVID-19, MRNA, LNP-S, PF, 30 MCG/0.3 ML DOSE VACCINE: CPT | Mod: PBBFAC | Performed by: FAMILY MEDICINE

## 2021-12-17 PROCEDURE — 91300 COVID-19, MRNA, LNP-S, PF, 30 MCG/0.3 ML DOSE VACCINE: CPT | Mod: PBBFAC | Performed by: FAMILY MEDICINE

## 2021-12-30 ENCOUNTER — LAB VISIT (OUTPATIENT)
Dept: FAMILY MEDICINE | Facility: CLINIC | Age: 36
End: 2021-12-30
Payer: COMMERCIAL

## 2021-12-30 DIAGNOSIS — Z20.822 EXPOSURE TO COVID-19 VIRUS: ICD-10-CM

## 2021-12-30 PROCEDURE — U0005 INFEC AGEN DETEC AMPLI PROBE: HCPCS | Performed by: FAMILY MEDICINE

## 2021-12-30 PROCEDURE — U0003 INFECTIOUS AGENT DETECTION BY NUCLEIC ACID (DNA OR RNA); SEVERE ACUTE RESPIRATORY SYNDROME CORONAVIRUS 2 (SARS-COV-2) (CORONAVIRUS DISEASE [COVID-19]), AMPLIFIED PROBE TECHNIQUE, MAKING USE OF HIGH THROUGHPUT TECHNOLOGIES AS DESCRIBED BY CMS-2020-01-R: HCPCS | Performed by: FAMILY MEDICINE

## 2021-12-31 LAB
SARS-COV-2 RNA RESP QL NAA+PROBE: NOT DETECTED
SARS-COV-2- CYCLE NUMBER: NORMAL

## 2022-07-18 ENCOUNTER — PATIENT MESSAGE (OUTPATIENT)
Dept: SURGERY | Facility: CLINIC | Age: 37
End: 2022-07-18
Payer: COMMERCIAL

## 2022-07-18 NOTE — TELEPHONE ENCOUNTER
Writer spoke to patient and scheduled her an appt with Dr Chambers on Friday 07/29/22 @ 11:15 am. Pt expressed verbal understanding.

## 2022-07-29 ENCOUNTER — OFFICE VISIT (OUTPATIENT)
Dept: SURGERY | Facility: CLINIC | Age: 37
End: 2022-07-29
Payer: COMMERCIAL

## 2022-07-29 VITALS
HEIGHT: 65 IN | WEIGHT: 181 LBS | DIASTOLIC BLOOD PRESSURE: 96 MMHG | HEART RATE: 77 BPM | BODY MASS INDEX: 30.16 KG/M2 | OXYGEN SATURATION: 97 % | RESPIRATION RATE: 16 BRPM | SYSTOLIC BLOOD PRESSURE: 135 MMHG

## 2022-07-29 DIAGNOSIS — R10.9 ABDOMINAL PAIN, UNSPECIFIED ABDOMINAL LOCATION: Primary | ICD-10-CM

## 2022-07-29 PROCEDURE — 3075F SYST BP GE 130 - 139MM HG: CPT | Mod: S$GLB,,, | Performed by: SURGERY

## 2022-07-29 PROCEDURE — 1159F PR MEDICATION LIST DOCUMENTED IN MEDICAL RECORD: ICD-10-PCS | Mod: S$GLB,,, | Performed by: SURGERY

## 2022-07-29 PROCEDURE — 3075F PR MOST RECENT SYSTOLIC BLOOD PRESS GE 130-139MM HG: ICD-10-PCS | Mod: S$GLB,,, | Performed by: SURGERY

## 2022-07-29 PROCEDURE — 3066F NEPHROPATHY DOC TX: CPT | Mod: S$GLB,,, | Performed by: SURGERY

## 2022-07-29 PROCEDURE — 99999 PR PBB SHADOW E&M-EST. PATIENT-LVL III: ICD-10-PCS | Mod: PBBFAC,,, | Performed by: SURGERY

## 2022-07-29 PROCEDURE — 3044F HG A1C LEVEL LT 7.0%: CPT | Mod: S$GLB,,, | Performed by: SURGERY

## 2022-07-29 PROCEDURE — 3008F PR BODY MASS INDEX (BMI) DOCUMENTED: ICD-10-PCS | Mod: S$GLB,,, | Performed by: SURGERY

## 2022-07-29 PROCEDURE — 3008F BODY MASS INDEX DOCD: CPT | Mod: S$GLB,,, | Performed by: SURGERY

## 2022-07-29 PROCEDURE — 1159F MED LIST DOCD IN RCRD: CPT | Mod: S$GLB,,, | Performed by: SURGERY

## 2022-07-29 PROCEDURE — 3061F PR NEG MICROALBUMINURIA RESULT DOCUMENTED/REVIEW: ICD-10-PCS | Mod: S$GLB,,, | Performed by: SURGERY

## 2022-07-29 PROCEDURE — 99213 OFFICE O/P EST LOW 20 MIN: CPT | Mod: PBBFAC | Performed by: SURGERY

## 2022-07-29 PROCEDURE — 99999 PR PBB SHADOW E&M-EST. PATIENT-LVL III: CPT | Mod: PBBFAC,,, | Performed by: SURGERY

## 2022-07-29 PROCEDURE — 99214 PR OFFICE/OUTPT VISIT, EST, LEVL IV, 30-39 MIN: ICD-10-PCS | Mod: S$GLB,,, | Performed by: SURGERY

## 2022-07-29 PROCEDURE — 3080F PR MOST RECENT DIASTOLIC BLOOD PRESSURE >= 90 MM HG: ICD-10-PCS | Mod: S$GLB,,, | Performed by: SURGERY

## 2022-07-29 PROCEDURE — 3066F PR DOCUMENTATION OF TREATMENT FOR NEPHROPATHY: ICD-10-PCS | Mod: S$GLB,,, | Performed by: SURGERY

## 2022-07-29 PROCEDURE — 4010F PR ACE/ARB THEARPY RXD/TAKEN: ICD-10-PCS | Mod: S$GLB,,, | Performed by: SURGERY

## 2022-07-29 PROCEDURE — 4010F ACE/ARB THERAPY RXD/TAKEN: CPT | Mod: S$GLB,,, | Performed by: SURGERY

## 2022-07-29 PROCEDURE — 3044F PR MOST RECENT HEMOGLOBIN A1C LEVEL <7.0%: ICD-10-PCS | Mod: S$GLB,,, | Performed by: SURGERY

## 2022-07-29 PROCEDURE — 99214 OFFICE O/P EST MOD 30 MIN: CPT | Mod: S$GLB,,, | Performed by: SURGERY

## 2022-07-29 PROCEDURE — 3061F NEG MICROALBUMINURIA REV: CPT | Mod: S$GLB,,, | Performed by: SURGERY

## 2022-07-29 PROCEDURE — 3080F DIAST BP >= 90 MM HG: CPT | Mod: S$GLB,,, | Performed by: SURGERY

## 2022-07-29 NOTE — PROGRESS NOTES
"[facility]  Follow-up    Subjective:     Chief Complaint:  Doc is my hernia back?    HPI:  Linda Hart is a 36 y.o. female presents today as established patient surgery Clinic desired further evaluation of previous hernia.  Patient underwent coronary orifice a couple of years ago.  Utilization of mesh.  Patient immediately after herniorrhaphy, found she was pregnant.  She underwent pregnancy and delivery of her child.  She in the last couple of weeks has had worsening pain near previous incision.  No bulging.  She is concerned she could have a hernia back and therefore presents today surgery Clinic evaluation.    Review of Systems   Constitutional: Negative for activity change, appetite change, chills and fever.   HENT: Negative for congestion, dental problem and ear discharge.    Eyes: Negative for discharge and itching.   Respiratory: Negative for apnea, choking and chest tightness.    Cardiovascular: Negative for chest pain and leg swelling.   Gastrointestinal: Positive for abdominal pain. Negative for abdominal distention, anal bleeding, constipation, diarrhea and nausea.   Endocrine: Negative for cold intolerance and heat intolerance.   Genitourinary: Negative for difficulty urinating and dyspareunia.   Musculoskeletal: Negative for arthralgias and back pain.   Skin: Negative for color change and pallor.   Neurological: Negative for dizziness and facial asymmetry.   Hematological: Negative for adenopathy. Does not bruise/bleed easily.   Psychiatric/Behavioral: Negative for agitation and behavioral problems.       Objective:      Vitals:    07/29/22 1123   BP: (!) 135/96   Pulse: 77   Resp: 16   SpO2: 97%   Weight: 82.1 kg (181 lb)   Height: 5' 5" (1.651 m)     Physical Exam  Constitutional:       General: She is not in acute distress.     Appearance: She is well-developed.   HENT:      Head: Normocephalic and atraumatic.   Eyes:      Pupils: Pupils are equal, round, and reactive to light. "   Neck:      Thyroid: No thyromegaly.   Cardiovascular:      Rate and Rhythm: Normal rate and regular rhythm.   Pulmonary:      Effort: Pulmonary effort is normal.      Breath sounds: Normal breath sounds.   Abdominal:      General: Bowel sounds are normal. There is no distension.      Palpations: Abdomen is soft.      Tenderness: There is no abdominal tenderness.       Musculoskeletal:         General: No deformity. Normal range of motion.      Cervical back: Normal range of motion and neck supple.   Skin:     General: Skin is warm.      Capillary Refill: Capillary refill takes less than 2 seconds.      Findings: No erythema.   Neurological:      Mental Status: She is alert and oriented to person, place, and time.      Cranial Nerves: No cranial nerve deficit.   Psychiatric:         Behavior: Behavior normal.          Assessment:       1. Abdominal pain, unspecified abdominal location        Plan:   Abdominal pain, unspecified abdominal location  -     CT Abdomen Pelvis  Without Contrast; Future; Expected date: 07/29/2022        Medical Decision Making/Counseling:  Abdominal pain at previous surgical site.  Cannot rule out hernia formation.  Will recommend patient undergo CT scan further evaluation to ensure no hernia present.  Will have patient re-presented surgery Clinic 1 CT scan complete.    Follow up:  1 CT scan complete.    Patient instructed that best way to communicate with my office staff is for patient to get on the [facility] patient portal to expedite communication and communication issues that may occur.  Patient was given instructions on how to get on the portal.  I encouraged patient to obtain portal access as well.  Ultimately it is up to the patient to obtain access.  Patient voiced understanding.

## 2022-08-10 ENCOUNTER — HOSPITAL ENCOUNTER (OUTPATIENT)
Dept: RADIOLOGY | Facility: HOSPITAL | Age: 37
Discharge: HOME | End: 2022-08-10
Attending: SURGERY
Payer: COMMERCIAL

## 2022-08-10 DIAGNOSIS — R10.9 ABDOMINAL PAIN, UNSPECIFIED ABDOMINAL LOCATION: ICD-10-CM

## 2022-08-10 PROCEDURE — 74176 CT ABDOMEN PELVIS WITHOUT CONTRAST: ICD-10-PCS | Mod: 26,,, | Performed by: RADIOLOGY

## 2022-08-10 PROCEDURE — 25500020 PHARM REV CODE 255: Performed by: SURGERY

## 2022-08-10 PROCEDURE — A9698 NON-RAD CONTRAST MATERIALNOC: HCPCS | Performed by: SURGERY

## 2022-08-10 PROCEDURE — 74176 CT ABD & PELVIS W/O CONTRAST: CPT | Mod: TC

## 2022-08-10 PROCEDURE — 74176 CT ABD & PELVIS W/O CONTRAST: CPT | Mod: 26,,, | Performed by: RADIOLOGY

## 2022-08-10 RX ADMIN — IOHEXOL 1000 ML: 9 SOLUTION ORAL at 09:08

## 2022-08-11 ENCOUNTER — OFFICE VISIT (OUTPATIENT)
Dept: SURGERY | Facility: CLINIC | Age: 37
End: 2022-08-11
Payer: COMMERCIAL

## 2022-08-11 VITALS
HEIGHT: 65 IN | BODY MASS INDEX: 29.82 KG/M2 | RESPIRATION RATE: 18 BRPM | SYSTOLIC BLOOD PRESSURE: 120 MMHG | WEIGHT: 179 LBS | DIASTOLIC BLOOD PRESSURE: 86 MMHG | HEART RATE: 72 BPM | OXYGEN SATURATION: 98 %

## 2022-08-11 DIAGNOSIS — K43.2 INCISIONAL HERNIA, WITHOUT OBSTRUCTION OR GANGRENE: Primary | ICD-10-CM

## 2022-08-11 PROCEDURE — 99999 PR PBB SHADOW E&M-EST. PATIENT-LVL IV: ICD-10-PCS | Mod: PBBFAC,,, | Performed by: SURGERY

## 2022-08-11 PROCEDURE — 3074F PR MOST RECENT SYSTOLIC BLOOD PRESSURE < 130 MM HG: ICD-10-PCS | Mod: S$GLB,,, | Performed by: SURGERY

## 2022-08-11 PROCEDURE — 99215 PR OFFICE/OUTPT VISIT, EST, LEVL V, 40-54 MIN: ICD-10-PCS | Mod: S$GLB,,, | Performed by: SURGERY

## 2022-08-11 PROCEDURE — 3008F BODY MASS INDEX DOCD: CPT | Mod: S$GLB,,, | Performed by: SURGERY

## 2022-08-11 PROCEDURE — 3074F SYST BP LT 130 MM HG: CPT | Mod: S$GLB,,, | Performed by: SURGERY

## 2022-08-11 PROCEDURE — 4010F ACE/ARB THERAPY RXD/TAKEN: CPT | Mod: S$GLB,,, | Performed by: SURGERY

## 2022-08-11 PROCEDURE — 3079F PR MOST RECENT DIASTOLIC BLOOD PRESSURE 80-89 MM HG: ICD-10-PCS | Mod: S$GLB,,, | Performed by: SURGERY

## 2022-08-11 PROCEDURE — 3079F DIAST BP 80-89 MM HG: CPT | Mod: S$GLB,,, | Performed by: SURGERY

## 2022-08-11 PROCEDURE — 1159F PR MEDICATION LIST DOCUMENTED IN MEDICAL RECORD: ICD-10-PCS | Mod: S$GLB,,, | Performed by: SURGERY

## 2022-08-11 PROCEDURE — 3061F NEG MICROALBUMINURIA REV: CPT | Mod: S$GLB,,, | Performed by: SURGERY

## 2022-08-11 PROCEDURE — 99215 OFFICE O/P EST HI 40 MIN: CPT | Mod: S$GLB,,, | Performed by: SURGERY

## 2022-08-11 PROCEDURE — 3008F PR BODY MASS INDEX (BMI) DOCUMENTED: ICD-10-PCS | Mod: S$GLB,,, | Performed by: SURGERY

## 2022-08-11 PROCEDURE — 3044F PR MOST RECENT HEMOGLOBIN A1C LEVEL <7.0%: ICD-10-PCS | Mod: S$GLB,,, | Performed by: SURGERY

## 2022-08-11 PROCEDURE — 4010F PR ACE/ARB THEARPY RXD/TAKEN: ICD-10-PCS | Mod: S$GLB,,, | Performed by: SURGERY

## 2022-08-11 PROCEDURE — 99999 PR PBB SHADOW E&M-EST. PATIENT-LVL IV: CPT | Mod: PBBFAC,,, | Performed by: SURGERY

## 2022-08-11 PROCEDURE — 3061F PR NEG MICROALBUMINURIA RESULT DOCUMENTED/REVIEW: ICD-10-PCS | Mod: S$GLB,,, | Performed by: SURGERY

## 2022-08-11 PROCEDURE — 1159F MED LIST DOCD IN RCRD: CPT | Mod: S$GLB,,, | Performed by: SURGERY

## 2022-08-11 PROCEDURE — 3066F NEPHROPATHY DOC TX: CPT | Mod: S$GLB,,, | Performed by: SURGERY

## 2022-08-11 PROCEDURE — 3044F HG A1C LEVEL LT 7.0%: CPT | Mod: S$GLB,,, | Performed by: SURGERY

## 2022-08-11 PROCEDURE — 3066F PR DOCUMENTATION OF TREATMENT FOR NEPHROPATHY: ICD-10-PCS | Mod: S$GLB,,, | Performed by: SURGERY

## 2022-08-11 RX ORDER — SODIUM CHLORIDE 9 MG/ML
INJECTION, SOLUTION INTRAVENOUS CONTINUOUS
Status: CANCELLED | OUTPATIENT
Start: 2022-08-11

## 2022-08-11 NOTE — PROGRESS NOTES
Patient scheduled for surgical procedure and pre-admit per Dr. Chambers Pre-admit scheduled on 10/21/22. Ventral hernia procedure scheduled for 10/26/22. Patient given blue folder containing all pre-op, liliana-op, and post op instructions.

## 2022-08-11 NOTE — H&P
[facility] H&P Note    Subjective:       Patient ID: Linda Hart is a 36 y.o. female.    Chief Complaint:  Doc I want my hernias fixed.  HPI:  Linda Hart is a 36 y.o. female history of hypertension presents today as established patient surgery Clinic for follow-up evaluation.  Patient previously underwent ventral hernia repair with mesh.  This was done, and postop, patient was noted to be pregnant.  She since has had the baby.  In the last couple of weeks she has had pressure bulging near her incision.  She underwent CT scan for concerns of recurrent hernia.  Recurrent hernias were noted inferior to the hernia repair site as well as right lateral.  Patient desires for operative repair given pressure pain that they cause.    Past Medical History:   Diagnosis Date    Anemia     Headache     Herpes simplex virus (HSV) infection     Hypertension      Past Surgical History:   Procedure Laterality Date    DILATION AND CURETTAGE OF UTERUS      REPAIR OF INCARCERATED VENTRAL HERNIA WITHOUT HISTORY OF PRIOR REPAIR N/A 2/4/2020    Procedure: REPAIR, HERNIA, VENTRAL, INCARCERATED, WITHOUT HISTORY OF PRIOR REPAIR;  Surgeon: Amadou Chambers MD;  Location: [facility] OR;  Service: General;  Laterality: N/A;     Family History   Problem Relation Age of Onset    Hyperlipidemia Sister     Breast cancer Maternal Grandmother     Diabetes Maternal Grandmother     Diabetes Mother      Social History     Socioeconomic History    Marital status: Single   Tobacco Use    Smoking status: Never Smoker    Smokeless tobacco: Never Used   Substance and Sexual Activity    Alcohol use: Yes    Drug use: No    Sexual activity: Not Currently     Partners: Male     Birth control/protection: None   Social History Narrative    ADVANCED MD PLANS         GYN Visit & [id number] CHARU11/2/2018     Contraceptive counseling    Abdominal Hernia        Visit Summary    Pt reports pain in abdomen    Referral to Dr Chambers     [FreeTextEntry1] : Patient presented at benign cyst conference on 3/23 with group recommendations to repeat Ca 19-9 in 2 months and repeat imaging in 1-year. Discussed plan with patient via telephone and in agreement with next steps  Pt is breastfeeding        Prescriptions: Hold Apri until finished breastfeeding. Pt has not started it.    SIG: Erma-BE 0.35 mg oral tablet, 28 days, Dispense #28 Tablet, 11 Refills    Directions: Take 1 oral tablet once a day        Return for follow up appointment prn.     GYN Exam (Annual/6Wk PP)10 [facility]     31 yo a1.  3wk pos bf   desires ocp..derpesison -0-.  hct 40        a-fp ...bv.....        p-cont pnv.  pap gc chl affirm desogen .....     PostPartum Visit      sp  x 2w...anemia hct 35...gest hypertension bp 122/76....depression -0- ...esires ocp    o-lung cl lucius rrrr abd soft nonteder leg nontedner    a-gest htn htn, anemia. fp    p-rtc wk cbc....ocp start in 1 wk     OB Visit      admit cytotec and Pitocin     OB Visit      rt clinic 1 week.     OB Visit      zovirax 400 mg bid...gc chl gbs hiv rpr ......rtc 1wk     OB Visit      gbs Return for follow up appointment 1wk     OB Visit      rt clinic 2 weeks     OB Visit      rt clinic in 2 weeks     OB Visit      rt clinic 2 weeks     OB Visit      32 yrs old...a1...bp 102/62...IUP @ 25 weeks/day    Here for follow up OB Visit...Doing good..        Return for follow up appointment in 4 weeks for us and follow up ob visit     OB Visit 61154/3/2018     iUP @ 19/6 week    Chlamydia        Visit Summary    Anatomy scan within normal limits    gc/ct on urine for test of cure        Return for follow up appointment in 4 weeks for follow up.     OB Visit      32 yrs old...follow up ob visit...iup @ 15/5...a1...bp 124/68    Return for follow up appointment 4 wks     OB Visit      gc chl ob profile...Phenergan 25mg orally     New OB 10 69498/15/2018     pap gc chl affirm ...rtc 3 wk for labs     Social Determinants of Health     Financial Resource Strain: Unknown    Difficulty of Paying Living  Expenses: Patient refused   Food Insecurity: Unknown    Worried About Running Out of Food in the Last Year: Patient refused    Ran Out of Food in the Last Year: Patient refused   Transportation Needs: Unknown    Lack of Transportation (Medical): Patient refused    Lack of Transportation (Non-Medical): Patient refused   Physical Activity: Unknown    Days of Exercise per Week: Patient refused   Stress: Unknown    Feeling of Stress : Patient refused   Social Connections: Unknown    Frequency of Communication with Friends and Family: Patient refused    Frequency of Social Gatherings with Friends and Family: Patient refused    Active Member of Clubs or Organizations: No    Attends Club or Organization Meetings: Never    Marital Status: Patient refused   Housing Stability: Unknown    Unable to Pay for Housing in the Last Year: Patient refused    Unstable Housing in the Last Year: Patient refused       Current Outpatient Medications   Medication Sig Dispense Refill    amLODIPine (NORVASC) 10 MG tablet Take 1 tablet (10 mg total) by mouth once daily. 30 tablet 0    drospirenone, contraceptive, (SLYND) 4 mg (28) Tab Take 1 tablet (4 mg total) by mouth once daily. 28 tablet 11    hydroCHLOROthiazide (HYDRODIURIL) 25 MG tablet Take 1 tablet (25 mg total) by mouth once daily. 30 tablet 0    losartan (COZAAR) 25 MG tablet Take 1 tablet (25 mg total) by mouth once daily. 30 tablet 0    metFORMIN (GLUCOPHAGE) 500 MG tablet Take 500mg po BID x 1 week, then 1000mg po q hs and 500mg po q am x 1 week, then 1000mg PO  tablet 11     No current facility-administered medications for this visit.     Review of patient's allergies indicates:  No Known Allergies    Review of Systems   Constitutional: Negative for activity change, appetite change, chills and fever.   HENT: Negative for congestion, dental problem and ear discharge.    Eyes: Negative for discharge and itching.   Respiratory: Negative for apnea, choking  "and chest tightness.    Cardiovascular: Negative for chest pain and leg swelling.   Gastrointestinal: Positive for abdominal pain. Negative for abdominal distention, anal bleeding, constipation, diarrhea and nausea.   Endocrine: Negative for cold intolerance and heat intolerance.   Genitourinary: Negative for difficulty urinating and dyspareunia.   Musculoskeletal: Negative for arthralgias and back pain.   Skin: Negative for color change and pallor.   Neurological: Negative for dizziness and facial asymmetry.   Hematological: Negative for adenopathy. Does not bruise/bleed easily.   Psychiatric/Behavioral: Negative for agitation and behavioral problems.       Objective:      Vitals:    08/11/22 1331   BP: 120/86   Pulse: 72   Resp: 18   SpO2: 98%   Weight: 81.2 kg (179 lb)   Height: 5' 5" (1.651 m)     Physical Exam  Constitutional:       General: She is not in acute distress.     Appearance: She is well-developed.   HENT:      Head: Normocephalic and atraumatic.   Eyes:      Pupils: Pupils are equal, round, and reactive to light.   Neck:      Thyroid: No thyromegaly.   Cardiovascular:      Rate and Rhythm: Normal rate and regular rhythm.   Pulmonary:      Effort: Pulmonary effort is normal.      Breath sounds: Normal breath sounds.   Abdominal:      General: Bowel sounds are normal. There is no distension.      Palpations: Abdomen is soft.      Tenderness: There is abdominal tenderness in the periumbilical area.   Musculoskeletal:         General: No deformity. Normal range of motion.      Cervical back: Normal range of motion and neck supple.   Skin:     General: Skin is warm.      Capillary Refill: Capillary refill takes less than 2 seconds.      Findings: No erythema.   Neurological:      Mental Status: She is alert and oriented to person, place, and time.      Cranial Nerves: No cranial nerve deficit.   Psychiatric:         Behavior: Behavior normal.       CT scan reviewed with patient clinic.     Assessment:    "    1. Incisional hernia, without obstruction or gangrene        Plan:   Incisional hernia, without obstruction or gangrene        Medical Decision Making/Counseling:  Patient with incisional hernia, recurrent, incarcerated.  Recommendation be hernia repair.  Risk benefits were discussed in detail with patient clinic today.  After risk benefits discussion, patient voiced understanding risk benefits wished to proceed near future.  All questions were answered to the patient's satisfaction.    Patient instructed that best way to communicate with my office staff is for patient to get on the [facility] patient portal to expedite communication and communication issues that may occur.  Patient was given instructions on how to get on the portal.  I encouraged patient to obtain portal access as well.  Ultimately it is up to the patient to obtain access.  Patient voiced understanding.

## 2022-09-09 ENCOUNTER — TELEPHONE (OUTPATIENT)
Dept: SURGERY | Facility: CLINIC | Age: 37
End: 2022-09-09
Payer: COMMERCIAL

## 2022-09-09 NOTE — TELEPHONE ENCOUNTER
Returned call. Instructed patient to bring documentation to clinic and we would fill it out for her mother.   Stated understanding.     ----- Message from Alyssa House sent at 9/9/2022  1:49 PM CDT -----  Contact: pt  Type: Needs Medical Advice    Who Called: pt  Best Call Back Number: [phone]    Inquiry/Question: pt is wanting to talk to you about filling out disability paperwork for her Mom to take care of her after surgery.         Thank you~

## 2022-09-16 ENCOUNTER — PATIENT MESSAGE (OUTPATIENT)
Dept: SURGERY | Facility: HOSPITAL | Age: 37
End: 2022-09-16
Payer: COMMERCIAL

## 2022-09-19 ENCOUNTER — PATIENT MESSAGE (OUTPATIENT)
Dept: SURGERY | Facility: HOSPITAL | Age: 37
End: 2022-09-19
Payer: COMMERCIAL

## 2022-10-06 ENCOUNTER — PATIENT MESSAGE (OUTPATIENT)
Dept: SURGERY | Facility: HOSPITAL | Age: 37
End: 2022-10-06
Payer: COMMERCIAL

## 2022-10-25 ENCOUNTER — TELEPHONE (OUTPATIENT)
Dept: SURGERY | Facility: CLINIC | Age: 37
End: 2022-10-25
Payer: COMMERCIAL

## 2022-10-25 NOTE — TELEPHONE ENCOUNTER
----- Message from Alyssa House sent at 10/25/2022  4:31 PM CDT -----  Contact: pt  Type: Needs Medical Advice    Who Called: pt  Best Call Back Number: [phone]    Inquiry/Question: pt has surgery in the morning and she stated that no one has called her on the time to be there or what to use tonight       Thank you~

## 2022-10-25 NOTE — TELEPHONE ENCOUNTER
Writer called pre op and spoke to Janelle and she stated that she just got off of the phone with patient with her time of arrival. Writer expressed verbal understanding.

## 2022-10-26 ENCOUNTER — ANESTHESIA EVENT (OUTPATIENT)
Dept: SURGERY | Facility: HOSPITAL | Age: 37
End: 2022-10-26
Payer: COMMERCIAL

## 2022-10-26 ENCOUNTER — ANESTHESIA (OUTPATIENT)
Dept: SURGERY | Facility: HOSPITAL | Age: 37
End: 2022-10-26
Payer: COMMERCIAL

## 2022-10-26 ENCOUNTER — HOSPITAL ENCOUNTER (OUTPATIENT)
Facility: HOSPITAL | Age: 37
Discharge: HOME | End: 2022-10-26
Attending: SURGERY | Admitting: SURGERY
Payer: COMMERCIAL

## 2022-10-26 VITALS
OXYGEN SATURATION: 100 % | SYSTOLIC BLOOD PRESSURE: 127 MMHG | DIASTOLIC BLOOD PRESSURE: 89 MMHG | HEART RATE: 98 BPM | TEMPERATURE: 97 F | HEIGHT: 65 IN | WEIGHT: 180 LBS | RESPIRATION RATE: 18 BRPM | BODY MASS INDEX: 29.99 KG/M2

## 2022-10-26 DIAGNOSIS — K43.2 INCISIONAL HERNIA, WITHOUT OBSTRUCTION OR GANGRENE: Primary | ICD-10-CM

## 2022-10-26 LAB — B-HCG UR QL: NEGATIVE

## 2022-10-26 PROCEDURE — D9220A PRA ANESTHESIA: Mod: CRNA,,, | Performed by: NURSE ANESTHETIST, CERTIFIED REGISTERED

## 2022-10-26 PROCEDURE — 88302 TISSUE EXAM BY PATHOLOGIST: CPT | Mod: 26,,, | Performed by: STUDENT IN AN ORGANIZED HEALTH CARE EDUCATION/TRAINING PROGRAM

## 2022-10-26 PROCEDURE — D9220A PRA ANESTHESIA: ICD-10-PCS | Mod: CRNA,,, | Performed by: NURSE ANESTHETIST, CERTIFIED REGISTERED

## 2022-10-26 PROCEDURE — 71000033 HC RECOVERY, INTIAL HOUR: Performed by: SURGERY

## 2022-10-26 PROCEDURE — 25000003 PHARM REV CODE 250: Performed by: SURGERY

## 2022-10-26 PROCEDURE — 81025 URINE PREGNANCY TEST: CPT | Performed by: SURGERY

## 2022-10-26 PROCEDURE — 88302 PR  SURG PATH,LEVEL II: ICD-10-PCS | Mod: 26,,, | Performed by: STUDENT IN AN ORGANIZED HEALTH CARE EDUCATION/TRAINING PROGRAM

## 2022-10-26 PROCEDURE — 25000003 PHARM REV CODE 250: Performed by: NURSE ANESTHETIST, CERTIFIED REGISTERED

## 2022-10-26 PROCEDURE — 63600175 PHARM REV CODE 636 W HCPCS: Performed by: ANESTHESIOLOGY

## 2022-10-26 PROCEDURE — 37000008 HC ANESTHESIA 1ST 15 MINUTES: Performed by: SURGERY

## 2022-10-26 PROCEDURE — D9220A PRA ANESTHESIA: Mod: ANES,,, | Performed by: ANESTHESIOLOGY

## 2022-10-26 PROCEDURE — 25000003 PHARM REV CODE 250: Performed by: ANESTHESIOLOGY

## 2022-10-26 PROCEDURE — 71000015 HC POSTOP RECOV 1ST HR: Performed by: SURGERY

## 2022-10-26 PROCEDURE — 63600175 PHARM REV CODE 636 W HCPCS: Performed by: NURSE ANESTHETIST, CERTIFIED REGISTERED

## 2022-10-26 PROCEDURE — 88302 TISSUE EXAM BY PATHOLOGIST: CPT | Performed by: STUDENT IN AN ORGANIZED HEALTH CARE EDUCATION/TRAINING PROGRAM

## 2022-10-26 PROCEDURE — 37000009 HC ANESTHESIA EA ADD 15 MINS: Performed by: SURGERY

## 2022-10-26 PROCEDURE — 36000706: Performed by: SURGERY

## 2022-10-26 PROCEDURE — 36000707: Performed by: SURGERY

## 2022-10-26 PROCEDURE — 49566 PR REPAIR RECURR INCIS HERNIA,STRANG: ICD-10-PCS | Mod: ICN,,, | Performed by: SURGERY

## 2022-10-26 PROCEDURE — 49566 PR REPAIR RECURR INCIS HERNIA,STRANG: CPT | Mod: ICN,,, | Performed by: SURGERY

## 2022-10-26 PROCEDURE — 63600175 PHARM REV CODE 636 W HCPCS: Performed by: SURGERY

## 2022-10-26 PROCEDURE — D9220A PRA ANESTHESIA: ICD-10-PCS | Mod: ANES,,, | Performed by: ANESTHESIOLOGY

## 2022-10-26 PROCEDURE — 71000039 HC RECOVERY, EACH ADD'L HOUR: Performed by: SURGERY

## 2022-10-26 RX ORDER — CEFAZOLIN SODIUM 2 G/50ML
2 SOLUTION INTRAVENOUS
Status: COMPLETED | OUTPATIENT
Start: 2022-10-26 | End: 2022-10-26

## 2022-10-26 RX ORDER — MIDAZOLAM HYDROCHLORIDE 1 MG/ML
INJECTION, SOLUTION INTRAMUSCULAR; INTRAVENOUS
Status: DISCONTINUED | OUTPATIENT
Start: 2022-10-26 | End: 2022-10-26

## 2022-10-26 RX ORDER — ROCURONIUM BROMIDE 10 MG/ML
INJECTION, SOLUTION INTRAVENOUS
Status: DISCONTINUED | OUTPATIENT
Start: 2022-10-26 | End: 2022-10-26

## 2022-10-26 RX ORDER — BUPIVACAINE HYDROCHLORIDE AND EPINEPHRINE 2.5; 5 MG/ML; UG/ML
INJECTION, SOLUTION EPIDURAL; INFILTRATION; INTRACAUDAL; PERINEURAL
Status: DISCONTINUED | OUTPATIENT
Start: 2022-10-26 | End: 2022-10-26 | Stop reason: HOSPADM

## 2022-10-26 RX ORDER — SODIUM CHLORIDE, SODIUM LACTATE, POTASSIUM CHLORIDE, CALCIUM CHLORIDE 600; 310; 30; 20 MG/100ML; MG/100ML; MG/100ML; MG/100ML
125 INJECTION, SOLUTION INTRAVENOUS CONTINUOUS
Status: DISCONTINUED | OUTPATIENT
Start: 2022-10-26 | End: 2022-10-26 | Stop reason: HOSPADM

## 2022-10-26 RX ORDER — ONDANSETRON 2 MG/ML
4 INJECTION INTRAMUSCULAR; INTRAVENOUS DAILY PRN
Status: DISCONTINUED | OUTPATIENT
Start: 2022-10-26 | End: 2022-10-26 | Stop reason: HOSPADM

## 2022-10-26 RX ORDER — LIDOCAINE HYDROCHLORIDE 10 MG/ML
1 INJECTION, SOLUTION EPIDURAL; INFILTRATION; INTRACAUDAL; PERINEURAL ONCE
Status: COMPLETED | OUTPATIENT
Start: 2022-10-26 | End: 2022-10-26

## 2022-10-26 RX ORDER — OXYCODONE AND ACETAMINOPHEN 5; 325 MG/1; MG/1
1 TABLET ORAL
Status: DISCONTINUED | OUTPATIENT
Start: 2022-10-26 | End: 2022-10-26 | Stop reason: HOSPADM

## 2022-10-26 RX ORDER — SCOLOPAMINE TRANSDERMAL SYSTEM 1 MG/1
1 PATCH, EXTENDED RELEASE TRANSDERMAL
Status: DISCONTINUED | OUTPATIENT
Start: 2022-10-26 | End: 2022-10-26 | Stop reason: HOSPADM

## 2022-10-26 RX ORDER — ACETAMINOPHEN 10 MG/ML
INJECTION, SOLUTION INTRAVENOUS
Status: DISCONTINUED | OUTPATIENT
Start: 2022-10-26 | End: 2022-10-26

## 2022-10-26 RX ORDER — SODIUM CHLORIDE 9 MG/ML
INJECTION, SOLUTION INTRAVENOUS CONTINUOUS
Status: DISCONTINUED | OUTPATIENT
Start: 2022-10-26 | End: 2022-10-26 | Stop reason: HOSPADM

## 2022-10-26 RX ORDER — NEOSTIGMINE METHYLSULFATE 1 MG/ML
INJECTION, SOLUTION INTRAVENOUS
Status: DISCONTINUED | OUTPATIENT
Start: 2022-10-26 | End: 2022-10-26

## 2022-10-26 RX ORDER — PROPOFOL 10 MG/ML
VIAL (ML) INTRAVENOUS
Status: DISCONTINUED | OUTPATIENT
Start: 2022-10-26 | End: 2022-10-26

## 2022-10-26 RX ORDER — MORPHINE SULFATE 4 MG/ML
INJECTION INTRAVENOUS
Status: DISCONTINUED | OUTPATIENT
Start: 2022-10-26 | End: 2022-10-26

## 2022-10-26 RX ORDER — MIDAZOLAM HYDROCHLORIDE 1 MG/ML
2 INJECTION INTRAMUSCULAR; INTRAVENOUS ONCE
Status: DISCONTINUED | OUTPATIENT
Start: 2022-10-26 | End: 2022-10-26 | Stop reason: HOSPADM

## 2022-10-26 RX ORDER — MORPHINE SULFATE 4 MG/ML
2 INJECTION, SOLUTION INTRAMUSCULAR; INTRAVENOUS EVERY 5 MIN PRN
Status: DISCONTINUED | OUTPATIENT
Start: 2022-10-26 | End: 2022-10-26 | Stop reason: HOSPADM

## 2022-10-26 RX ORDER — DEXAMETHASONE SODIUM PHOSPHATE 4 MG/ML
INJECTION, SOLUTION INTRA-ARTICULAR; INTRALESIONAL; INTRAMUSCULAR; INTRAVENOUS; SOFT TISSUE
Status: DISCONTINUED | OUTPATIENT
Start: 2022-10-26 | End: 2022-10-26

## 2022-10-26 RX ORDER — ONDANSETRON 2 MG/ML
INJECTION INTRAMUSCULAR; INTRAVENOUS
Status: DISCONTINUED | OUTPATIENT
Start: 2022-10-26 | End: 2022-10-26

## 2022-10-26 RX ORDER — SODIUM CHLORIDE, SODIUM LACTATE, POTASSIUM CHLORIDE, CALCIUM CHLORIDE 600; 310; 30; 20 MG/100ML; MG/100ML; MG/100ML; MG/100ML
INJECTION, SOLUTION INTRAVENOUS CONTINUOUS
Status: DISCONTINUED | OUTPATIENT
Start: 2022-10-26 | End: 2022-10-26 | Stop reason: HOSPADM

## 2022-10-26 RX ORDER — CEFAZOLIN SODIUM 2 G/50ML
SOLUTION INTRAVENOUS
Status: COMPLETED
Start: 2022-10-26 | End: 2022-10-26

## 2022-10-26 RX ORDER — OXYCODONE AND ACETAMINOPHEN 5; 325 MG/1; MG/1
1 TABLET ORAL EVERY 6 HOURS PRN
Qty: 30 TABLET | Refills: 0 | Status: SHIPPED | OUTPATIENT
Start: 2022-10-26 | End: 2022-11-05

## 2022-10-26 RX ORDER — ONDANSETRON 4 MG/1
4 TABLET, FILM COATED ORAL EVERY 6 HOURS PRN
Qty: 30 TABLET | Refills: 0 | Status: SHIPPED | OUTPATIENT
Start: 2022-10-26 | End: 2022-11-05

## 2022-10-26 RX ADMIN — LIDOCAINE HYDROCHLORIDE 40 MG: 10 INJECTION, SOLUTION EPIDURAL; INFILTRATION; INTRACAUDAL; PERINEURAL at 11:10

## 2022-10-26 RX ADMIN — SODIUM CHLORIDE, SODIUM LACTATE, POTASSIUM CHLORIDE, AND CALCIUM CHLORIDE: .6; .31; .03; .02 INJECTION, SOLUTION INTRAVENOUS at 10:10

## 2022-10-26 RX ADMIN — CEFAZOLIN SODIUM 2 G: 2 SOLUTION INTRAVENOUS at 11:10

## 2022-10-26 RX ADMIN — MORPHINE SULFATE 2 MG: 4 INJECTION INTRAVENOUS at 01:10

## 2022-10-26 RX ADMIN — ROCURONIUM BROMIDE 30 MG: 10 SOLUTION INTRAVENOUS at 11:10

## 2022-10-26 RX ADMIN — PROPOFOL 200 MG: 10 INJECTION, EMULSION INTRAVENOUS at 11:10

## 2022-10-26 RX ADMIN — GLYCOPYRROLATE 0.8 MG: 0.4 INJECTION INTRAMUSCULAR; INTRAVENOUS at 12:10

## 2022-10-26 RX ADMIN — OXYCODONE HYDROCHLORIDE AND ACETAMINOPHEN 1 TABLET: 5; 325 TABLET ORAL at 02:10

## 2022-10-26 RX ADMIN — ACETAMINOPHEN 1000 MG: 10 INJECTION INTRAVENOUS at 11:10

## 2022-10-26 RX ADMIN — ONDANSETRON HYDROCHLORIDE 4 MG: 2 SOLUTION INTRAMUSCULAR; INTRAVENOUS at 11:10

## 2022-10-26 RX ADMIN — NEOSTIGMINE METHYLSULFATE 4 MG: 1 INJECTION INTRAVENOUS at 12:10

## 2022-10-26 RX ADMIN — MIDAZOLAM HYDROCHLORIDE 2 MG: 1 INJECTION, SOLUTION INTRAMUSCULAR; INTRAVENOUS at 11:10

## 2022-10-26 RX ADMIN — DEXAMETHASONE SODIUM PHOSPHATE 4 MG: 4 INJECTION, SOLUTION INTRA-ARTICULAR; INTRALESIONAL; INTRAMUSCULAR; INTRAVENOUS; SOFT TISSUE at 11:10

## 2022-10-26 RX ADMIN — MORPHINE SULFATE 4 MG: 4 INJECTION INTRAVENOUS at 11:10

## 2022-10-26 NOTE — OP NOTE
[facility]  Operative Note     SUMMARY     Surgery Date: 10/26/2022     Pre-op Diagnosis:  Incisional hernia, without obstruction or gangrene [K43.2]    Post-op Diagnosis:  Chronically incarcerated ventral hernia to the right of the previous hernia repair.  Additional chronically incarcerated ventral hernia inferior to the incisional hernia repair.    Operation:  Chronically incarcerated recurrent incisional hernias, 2, containing viscera.  49566 x2    Surgeon(s) and Role:     * Amadou Chambers MD - Primary    Assistant: None    Antibiotics: Ancef 2 gram IV    Estimated Blood Loss: 15cc    Anesthesia:  General    Description of the findings of the procedure:  Chronically incarcerated, recurrent incisional hernia to the right of previous repair. Repair over top with sutures.  Additional chronically incarcerated recurrent incisional hernia inferior to previous repair.  Repaired with sutures.    Specimens:  Hernia sac.    Complications:  None apparent in the operative room.    Implants:  None.         Indications for Procedure:     Ms Hart is a 35yo F presented to clinic for evaluation of recurrent incisional hernia.  Patient desires for operative repair.  Risk benefits were discussed.  CT scan was obtained.  There is evidence of 2 hernia defects.  She was offered incisional hernia repair x2.  Risk benefits were discussed.  She voiced understanding risk benefits wished to proceed today signed informed consent.  All questions were answered to patient's satisfaction.    Procedure:     Patient is brought back in the operative room placed on table supine position.  General anesthesia was given per the anesthesia team, please see separate dictated anesthesia note for details.  A time-out was conducted with all the operative room in agreement correct patient correct procedure correct allergies correct antibiotics.  Patient was given 2 g Ancef prior to surgical incision.    Skin incision was made in previous  skin incision from hernia repair.  Skin incision is carried down to fascia Bovie electric cautery.  There was evidence on the right side of the previous repair of a recurrent incisional hernia.  Chronically incarcerated.  Contents operatively reduced.  2 cm fascial defect.  Closed primarily with 0 Ethibond sutures.      Inferior to the previous hernia repair there was evidence of additional 2 small fascial holes.  Contents again operatively reduced.  Repair fascia at both these sites was performed with 0 Ethibond sutures.    Patient tolerated procedure well she was reversed from anesthetic agent transfer back to postop care in stable condition.  All counts were correct at the end procedure including lap pads instruments well as needles.  Plan be discharged home today with adequate pain nausea medication follow-up surgery clinic in 2 weeks.  Absolutely no heavy lifting greater than 10 lb for the next 6 weeks.

## 2022-10-26 NOTE — DISCHARGE INSTRUCTIONS

## 2022-10-26 NOTE — ANESTHESIA PROCEDURE NOTES
Intubation    Date/Time: 10/26/2022 11:33 AM  Performed by: Felicity Alexis CRNA  Authorized by: Alonso Bower MD     Intubation:     Induction:  Intravenous    Intubated:  Postinduction    Mask Ventilation:  Easy mask    Attempts:  1    Attempted By:  CRNA    Method of Intubation:  Direct    Blade:  Jessica 3    Laryngeal View Grade: Grade I - full view of cords      Difficult Airway Encountered?: No      Complications:  None    Airway Device:  Oral endotracheal tube    Airway Device Size:  7.0    Style/Cuff Inflation:  Cuffed    Tube secured:  22    Secured at:  The teeth    Placement Verified By:  Capnometry    Complicating Factors:  None    Findings Post-Intubation:  BS equal bilateral

## 2022-10-26 NOTE — H&P
[facility] H&P Note    Subjective:       Patient ID: Linda Hart is a 36 y.o. female.    Chief Complaint:  Doc I want my hernias fixed.  HPI:  Linda Hart is a 36 y.o. female history of hypertension presents today as established patient surgery Clinic for follow-up evaluation.  Patient previously underwent ventral hernia repair with mesh.  This was done, and postop, patient was noted to be pregnant.  She since has had the baby.  In the last couple of weeks she has had pressure bulging near her incision.  She underwent CT scan for concerns of recurrent hernia.  Recurrent hernias were noted inferior to the hernia repair site as well as right lateral.  Patient desires for operative repair given pressure pain that they cause.    Past Medical History:   Diagnosis Date    Anemia     Headache     Herpes simplex virus (HSV) infection     Hypertension      Past Surgical History:   Procedure Laterality Date    DILATION AND CURETTAGE OF UTERUS      REPAIR OF INCARCERATED VENTRAL HERNIA WITHOUT HISTORY OF PRIOR REPAIR N/A 2/4/2020    Procedure: REPAIR, HERNIA, VENTRAL, INCARCERATED, WITHOUT HISTORY OF PRIOR REPAIR;  Surgeon: Amadou Chambers MD;  Location: [facility];  Service: General;  Laterality: N/A;     Family History   Problem Relation Age of Onset    Hyperlipidemia Sister     Breast cancer Maternal Grandmother     Diabetes Maternal Grandmother     Diabetes Mother      Social History     Socioeconomic History    Marital status: Single   Tobacco Use    Smoking status: Never    Smokeless tobacco: Never   Substance and Sexual Activity    Alcohol use: Yes    Drug use: No    Sexual activity: Not Currently     Partners: Male     Birth control/protection: None   Social History Narrative    ADVANCED MD PLANS         [id number]     Contraceptive counseling    Abdominal Hernia        Visit Summary    Pt reports pain in abdomen    Referral to Dr Chambers    Pt is breastfeeding         Prescriptions: Hold Apri until finished breastfeeding. Pt has not started it.    SIG: Erma-BE 0.35 mg oral tablet, 28 days, Dispense #28 Tablet, 11 Refills    Directions: Take 1 oral tablet once a day        Return for follow up appointment prn.     GYN Exam (Annual/6Wk PP)10 [facility]     33 yo a1.  3wk pos bf   desires ocp..derpesison -0-.  hct 40        a-fp ...bv.....        p-cont pnv.  pap gc chl affirm desogen .....     PostPartum Visit      sp  x 2w...anemia hct 35...gest hypertension bp 122/76....depression -0- ...esires ocp    o-lung cl lucius rrrr abd soft nonteder leg nontedner    a-gest htn htn, anemia. fp    p-rtc wk cbc....ocp start in 1 wk     OB Visit      admit cytotec and Pitocin     OB Visit      rt clinic 1 week.     OB Visit      zovirax 400 mg bid...gc chl gbs hiv rpr ......rtc 1wk     OB Visit      gbs Return for follow up appointment 1wk     OB Visit      rt clinic 2 weeks     OB Visit      rt clinic in 2 weeks     OB Visit      rt clinic 2 weeks     OB Visit      32 yrs old...a1...bp 102/62...IUP @ 25 weeks/day    Here for follow up OB Visit...Doing good..        Return for follow up appointment in 4 weeks for us and follow up ob visit     OB Visit 28170/3/2018     iUP @ 19/6 week    Chlamydia        Visit Summary    Anatomy scan within normal limits    gc/ct on urine for test of cure        Return for follow up appointment in 4 weeks for follow up.     OB Visit      32 yrs old...follow up ob visit...iup @ 15/5...a1...bp 124/68    Return for follow up appointment 4 wks     OB Visit      gc chl ob profile...Phenergan 25mg orally     New OB 10 25814/15/2018     pap gc chl affirm ...rtc 3 wk for labs     Social Determinants of Health     Financial Resource Strain: Unknown    Difficulty of Paying Living Expenses: Patient refused   Food  Insecurity: Unknown    Worried About Running Out of Food in the Last Year: Patient refused    Ran Out of Food in the Last Year: Patient refused   Transportation Needs: Unknown    Lack of Transportation (Medical): Patient refused    Lack of Transportation (Non-Medical): Patient refused   Physical Activity: Unknown    Days of Exercise per Week: Patient refused   Stress: Unknown    Feeling of Stress : Patient refused   Social Connections: Unknown    Frequency of Communication with Friends and Family: Patient refused    Frequency of Social Gatherings with Friends and Family: Patient refused    Active Member of Clubs or Organizations: No    Attends Club or Organization Meetings: Never    Marital Status: Patient refused   Housing Stability: Unknown    Unable to Pay for Housing in the Last Year: Patient refused    Unstable Housing in the Last Year: Patient refused       No current facility-administered medications for this encounter.     Review of patient's allergies indicates:  No Known Allergies    Review of Systems   Constitutional:  Negative for activity change, appetite change, chills and fever.   HENT:  Negative for congestion, dental problem and ear discharge.    Eyes:  Negative for discharge and itching.   Respiratory:  Negative for apnea, choking and chest tightness.    Cardiovascular:  Negative for chest pain and leg swelling.   Gastrointestinal:  Positive for abdominal pain. Negative for abdominal distention, anal bleeding, constipation, diarrhea and nausea.   Endocrine: Negative for cold intolerance and heat intolerance.   Genitourinary:  Negative for difficulty urinating and dyspareunia.   Musculoskeletal:  Negative for arthralgias and back pain.   Skin:  Negative for color change and pallor.   Neurological:  Negative for dizziness and facial asymmetry.   Hematological:  Negative for adenopathy. Does not bruise/bleed easily.   Psychiatric/Behavioral:  Negative for agitation and behavioral problems.       Objective:      There were no vitals filed for this visit.    Physical Exam  Constitutional:       General: She is not in acute distress.     Appearance: She is well-developed.   HENT:      Head: Normocephalic and atraumatic.   Eyes:      Pupils: Pupils are equal, round, and reactive to light.   Neck:      Thyroid: No thyromegaly.   Cardiovascular:      Rate and Rhythm: Normal rate and regular rhythm.   Pulmonary:      Effort: Pulmonary effort is normal.      Breath sounds: Normal breath sounds.   Abdominal:      General: Bowel sounds are normal. There is no distension.      Palpations: Abdomen is soft.      Tenderness: There is abdominal tenderness in the periumbilical area.   Musculoskeletal:         General: No deformity. Normal range of motion.      Cervical back: Normal range of motion and neck supple.   Skin:     General: Skin is warm.      Capillary Refill: Capillary refill takes less than 2 seconds.      Findings: No erythema.   Neurological:      Mental Status: She is alert and oriented to person, place, and time.      Cranial Nerves: No cranial nerve deficit.   Psychiatric:         Behavior: Behavior normal.     CT scan reviewed with patient clinic.     Assessment:       No diagnosis found.      Plan:   There are no diagnoses linked to this encounter.      Medical Decision Making/Counseling:  Patient with incisional hernia, recurrent, incarcerated.  Recommendation be hernia repair.  Risk benefits were discussed in detail with patient clinic today.  After risk benefits discussion, patient voiced understanding risk benefits wished to proceed near future.  All questions were answered to the patient's satisfaction.    Patient instructed that best way to communicate with my office staff is for patient to get on [facility] portal to expedite communication and communication issues that may occur.  Patient was given instructions on how to get on the portal.  I encouraged patient to obtain portal  access as well.  Ultimately it is up to the patient to obtain access.  Patient voiced understanding.

## 2022-10-26 NOTE — ANESTHESIA POSTPROCEDURE EVALUATION
Anesthesia Post Evaluation    Patient: Linda Hart    Procedure(s) Performed: Procedure(s) (LRB):  REPAIR, HERNIA, VENTRAL, INCARCERATED, WITHOUT HISTORY OF PRIOR REPAIR (N/A)    Final Anesthesia Type: general      Patient location during evaluation: PACU  Patient participation: Yes- Able to Participate  Level of consciousness: awake and awake and alert  Post-procedure vital signs: reviewed and stable  Pain management: adequate  Airway patency: patent    PONV status at discharge: No PONV  Anesthetic complications: no      Cardiovascular status: blood pressure returned to baseline  Respiratory status: unassisted and spontaneous ventilation  Hydration status: euvolemic  Follow-up not needed.          Vitals Value Taken Time   /58 10/26/22 1318   Temp 36.3 °C (97.3 °F) 10/26/22 1245   Pulse 73 10/26/22 1321   Resp 45 10/26/22 1321   SpO2 100 % 10/26/22 1321   Vitals shown include unvalidated device data.      No case tracking events are documented in the log.      Pain/Kristen Score: Pain Rating Prior to Med Admin: 10 (10/26/2022  1:05 PM)  Pain Rating Post Med Admin: 2 (10/26/2022  1:10 PM)  Kristen Score: 8 (10/26/2022  1:00 PM)

## 2022-10-26 NOTE — TRANSFER OF CARE
"Anesthesia Transfer of Care Note    Patient: Linda Hart    Procedure(s) Performed: Procedure(s) (LRB):  REPAIR, HERNIA, VENTRAL, INCARCERATED, WITHOUT HISTORY OF PRIOR REPAIR (N/A)    Patient location: PACU    Anesthesia Type: general    Transport from OR: Transported from OR on room air with adequate spontaneous ventilation    Post pain: adequate analgesia    Post assessment: no apparent anesthetic complications and tolerated procedure well    Post vital signs: stable    Level of consciousness: awake, alert and oriented    Nausea/Vomiting: no nausea/vomiting    Complications: none    Transfer of care protocol was followed      Last vitals:   Visit Vitals  /88   Pulse 71   Temp 36.7 °C (98.1 °F)   Resp 18   Ht 5' 5" (1.651 m)   Wt 81.6 kg (180 lb)   SpO2 99%   Breastfeeding No   BMI 29.95 kg/m²     "

## 2022-10-26 NOTE — ANESTHESIA PREPROCEDURE EVALUATION
10/26/2022  Linda Hart is a 36 y.o., female.      Pre-op Assessment    I have reviewed the Patient Summary Reports.     I have reviewed the Nursing Notes.    I have reviewed the Medications.     Review of Systems  Anesthesia Hx:  No problems with previous Anesthesia  Neg history of prior surgery. Denies Family Hx of Anesthesia complications.   Denies Personal Hx of Anesthesia complications.   Social:  Non-Smoker    Hematology/Oncology:  Hematology Normal   Oncology Normal     EENT/Dental:EENT/Dental Normal   Cardiovascular:   Hypertension, well controlled    Pulmonary:  Pulmonary Normal    Renal/:  Renal/ Normal     Hepatic/GI:  Hepatic/GI Normal    Musculoskeletal:  Musculoskeletal Normal    Neurological:   Headaches    Endocrine:  Endocrine Normal    Dermatological:  Skin Normal    Psych:  Psychiatric Normal           Physical Exam  General: Alert    Airway:  Mallampati: II   Mouth Opening: Normal  TM Distance: Normal  Neck ROM: Normal ROM    Dental:  Intact    Chest/Lungs:  Clear to auscultation, Normal Respiratory Rate    Heart:  Rate: Normal    Abdomen:  Normal        Anesthesia Plan  Type of Anesthesia, risks & benefits discussed:    Anesthesia Type: Gen ETT  Post Op Pain Control Plan: multimodal analgesia  Airway Plan: Direct, Post-Induction  Informed Consent: Informed consent signed with the Patient and all parties understand the risks and agree with anesthesia plan.  All questions answered. Patient consented to blood products? No  ASA Score: 2  Day of Surgery Review of History & Physical: H&P Update referred to the surgeon/provider.    Ready For Surgery From Anesthesia Perspective.     .

## 2022-10-26 NOTE — DISCHARGE SUMMARY
Discharge Note        SUMMARY     Admit Date: 10/26/2022    Attending Physician: Amadou Chambers MD     Discharge Physician: Amadou Chambers MD    Discharge Date: 10/26/2022 12:30 PM      Hospital Course: Patient tolerated procedure well.     Disposition: Home or Self Care    Patient Instructions:   Current Discharge Medication List        START taking these medications    Details   ondansetron (ZOFRAN) 4 MG tablet Take 1 tablet (4 mg total) by mouth every 6 (six) hours as needed for Nausea.  Qty: 30 tablet, Refills: 0      oxyCODONE-acetaminophen (PERCOCET) 5-325 mg per tablet Take 1 tablet by mouth every 6 (six) hours as needed for Pain.  Qty: 30 tablet, Refills: 0    Comments: n/a            CONTINUE these medications which have NOT CHANGED    Details   amLODIPine (NORVASC) 10 MG tablet Take 1 tablet (10 mg total) by mouth once daily.  Qty: 90 tablet, Refills: 3    Comments: .  Associated Diagnoses: Essential hypertension      drospirenone, contraceptive, (SLYND) 4 mg (28) Tab Take 1 tablet (4 mg total) by mouth once daily.  Qty: 28 tablet, Refills: 11    Associated Diagnoses: Encounter for initial prescription of contraceptive pills      hydroCHLOROthiazide (HYDRODIURIL) 25 MG tablet Take 1 tablet (25 mg total) by mouth once daily.  Qty: 90 tablet, Refills: 3    Comments: .  Associated Diagnoses: Essential hypertension      losartan (COZAAR) 25 MG tablet Take 1 tablet (25 mg total) by mouth once daily.  Qty: 90 tablet, Refills: 3    Comments: .  Associated Diagnoses: Hyperlipidemia, unspecified hyperlipidemia type      norelgestromin-ethinyl estradiol (XULANE) 150-35 mcg/24 hr Place 1 patch onto the skin once a week.  Qty: 4 patch, Refills: 11      metFORMIN (GLUCOPHAGE) 500 MG tablet Take 500mg po BID x 1 week, then 1000mg po q hs and 500mg po q am x 1 week, then 1000mg PO BID  Qty: 120 tablet, Refills: 11    Associated Diagnoses: Prediabetes             Discharge Procedure Orders (must include  Diet, Follow-up, Activity):   Discharge Procedure Orders (must include Diet, Follow-up, Activity)   Diet general     Lifting restrictions   Order Comments: No heavy lifting, pushing, pulling, bending, strenuous exercise greater than 10 lb for the next 6 weeks.     Other restrictions (specify):   Order Comments: No swimming or submersion of wounds in lakes, ponds, streams, oceans, bathtubs, etc until seen in clinic for postoperative evaluation.     No dressing needed     Call MD for:  temperature >100.4     Call MD for:  persistent nausea and vomiting     Call MD for:  severe uncontrolled pain     Call MD for:  difficulty breathing, headache or visual disturbances     Call MD for:  redness, tenderness, or signs of infection (pain, swelling, redness, odor or green/yellow discharge around incision site)     Call MD for:  persistent dizziness or light-headedness        Follow Up:  Follow up as scheduled.  Resume routine diet.  Activity as tolerated.    No driving day of procedure.

## 2022-10-31 LAB
FINAL PATHOLOGIC DIAGNOSIS: NORMAL
GROSS: NORMAL
Lab: NORMAL

## 2022-11-10 ENCOUNTER — OFFICE VISIT (OUTPATIENT)
Dept: SURGERY | Facility: CLINIC | Age: 37
End: 2022-11-10
Payer: COMMERCIAL

## 2022-11-10 VITALS
SYSTOLIC BLOOD PRESSURE: 141 MMHG | WEIGHT: 175 LBS | RESPIRATION RATE: 18 BRPM | BODY MASS INDEX: 29.16 KG/M2 | OXYGEN SATURATION: 97 % | DIASTOLIC BLOOD PRESSURE: 97 MMHG | HEIGHT: 65 IN | HEART RATE: 65 BPM

## 2022-11-10 DIAGNOSIS — Z09 POSTOP CHECK: Primary | ICD-10-CM

## 2022-11-10 PROCEDURE — 3061F NEG MICROALBUMINURIA REV: CPT | Mod: S$GLB,,, | Performed by: SURGERY

## 2022-11-10 PROCEDURE — 99999 PR PBB SHADOW E&M-EST. PATIENT-LVL III: ICD-10-PCS | Mod: PBBFAC,,, | Performed by: SURGERY

## 2022-11-10 PROCEDURE — 99024 POSTOP FOLLOW-UP VISIT: CPT | Mod: S$GLB,,, | Performed by: SURGERY

## 2022-11-10 PROCEDURE — 3080F PR MOST RECENT DIASTOLIC BLOOD PRESSURE >= 90 MM HG: ICD-10-PCS | Mod: S$GLB,,, | Performed by: SURGERY

## 2022-11-10 PROCEDURE — 4010F ACE/ARB THERAPY RXD/TAKEN: CPT | Mod: S$GLB,,, | Performed by: SURGERY

## 2022-11-10 PROCEDURE — 3044F PR MOST RECENT HEMOGLOBIN A1C LEVEL <7.0%: ICD-10-PCS | Mod: S$GLB,,, | Performed by: SURGERY

## 2022-11-10 PROCEDURE — 3077F SYST BP >= 140 MM HG: CPT | Mod: S$GLB,,, | Performed by: SURGERY

## 2022-11-10 PROCEDURE — 99024 PR POST-OP FOLLOW-UP VISIT: ICD-10-PCS | Mod: S$GLB,,, | Performed by: SURGERY

## 2022-11-10 PROCEDURE — 3066F PR DOCUMENTATION OF TREATMENT FOR NEPHROPATHY: ICD-10-PCS | Mod: S$GLB,,, | Performed by: SURGERY

## 2022-11-10 PROCEDURE — 3077F PR MOST RECENT SYSTOLIC BLOOD PRESSURE >= 140 MM HG: ICD-10-PCS | Mod: S$GLB,,, | Performed by: SURGERY

## 2022-11-10 PROCEDURE — 3061F PR NEG MICROALBUMINURIA RESULT DOCUMENTED/REVIEW: ICD-10-PCS | Mod: S$GLB,,, | Performed by: SURGERY

## 2022-11-10 PROCEDURE — 4010F PR ACE/ARB THEARPY RXD/TAKEN: ICD-10-PCS | Mod: S$GLB,,, | Performed by: SURGERY

## 2022-11-10 PROCEDURE — 3044F HG A1C LEVEL LT 7.0%: CPT | Mod: S$GLB,,, | Performed by: SURGERY

## 2022-11-10 PROCEDURE — 99999 PR PBB SHADOW E&M-EST. PATIENT-LVL III: CPT | Mod: PBBFAC,,, | Performed by: SURGERY

## 2022-11-10 PROCEDURE — 3066F NEPHROPATHY DOC TX: CPT | Mod: S$GLB,,, | Performed by: SURGERY

## 2022-11-10 PROCEDURE — 1159F PR MEDICATION LIST DOCUMENTED IN MEDICAL RECORD: ICD-10-PCS | Mod: S$GLB,,, | Performed by: SURGERY

## 2022-11-10 PROCEDURE — 3080F DIAST BP >= 90 MM HG: CPT | Mod: S$GLB,,, | Performed by: SURGERY

## 2022-11-10 PROCEDURE — 1159F MED LIST DOCD IN RCRD: CPT | Mod: S$GLB,,, | Performed by: SURGERY

## 2022-11-10 PROCEDURE — 3008F BODY MASS INDEX DOCD: CPT | Mod: S$GLB,,, | Performed by: SURGERY

## 2022-11-10 PROCEDURE — 3008F PR BODY MASS INDEX (BMI) DOCUMENTED: ICD-10-PCS | Mod: S$GLB,,, | Performed by: SURGERY

## 2022-11-10 NOTE — PROGRESS NOTES
"General Surgery  [facility]  Follow-up    HPI/Follow-up exam:  Linda Hart is a 37 y.o. female presents today for follow-up examination.  Patient underwent hernia repair x2 recently.  Doing well.  No issues.  Pain improved.  No nausea vomiting.  Tolerating diet.  Having bowel function.  No other new issues or complaints.    PHYSICAL EXAM:  BP (!) 141/97   Pulse 65   Resp 18   Ht 5' 5" (1.651 m)   Wt 79.4 kg (175 lb)   SpO2 97%   BMI 29.12 kg/m²   Gen: Wd Wn female in NAD  Heent: Nc/At, MMM  Cv: RRR  Lung: Non-labored breathing, clear bilaterally  Abd: Soft, non-tender, non-distended, surgical site clean dry intact no signs or symptoms of infection.  Ext: No cyanosis clubbing or edema    Pathology:  Benign hernia sac.    Assessment:  Linda Hart is a 37 y.o. female s/p hernia repair, incisional.    Plan/Medical Decision Making:  Doing well.  No issues.  Continue lifting precautions.  Continue precautions for 4 weeks.  Return to normal activities after that time.  Follow-up surgery clinic    Followup:  As needed.    Patient instructed that best way to communicate with my office staff is for patient to get on the [facility] patient portal to expedite communication and communication issues that may occur.  Patient was given instructions on how to get on the portal.  I encouraged patient to obtain portal access as well.  Ultimately it is up to the patient to obtain access.  Patient voiced understanding.          "

## 2024-03-11 ENCOUNTER — HOSPITAL ENCOUNTER (OUTPATIENT)
Dept: RADIOLOGY | Facility: HOSPITAL | Age: 39
Discharge: HOME | End: 2024-03-11
Attending: NURSE PRACTITIONER
Payer: COMMERCIAL

## 2024-03-11 DIAGNOSIS — Z12.31 ENCOUNTER FOR SCREENING MAMMOGRAM FOR MALIGNANT NEOPLASM OF BREAST: ICD-10-CM

## 2024-03-11 DIAGNOSIS — Z80.3 FAMILY HISTORY OF BREAST CANCER: ICD-10-CM

## 2024-03-11 PROCEDURE — 77063 BREAST TOMOSYNTHESIS BI: CPT | Mod: 26,,, | Performed by: RADIOLOGY

## 2024-03-11 PROCEDURE — 77067 SCR MAMMO BI INCL CAD: CPT | Mod: 26,,, | Performed by: RADIOLOGY

## 2024-03-11 PROCEDURE — 77067 SCR MAMMO BI INCL CAD: CPT | Mod: TC

## 2024-03-12 ENCOUNTER — HOSPITAL ENCOUNTER (OUTPATIENT)
Dept: RADIOLOGY | Facility: HOSPITAL | Age: 39
Discharge: HOME | End: 2024-03-12
Attending: NURSE PRACTITIONER
Payer: COMMERCIAL

## 2024-03-12 DIAGNOSIS — R92.8 ABNORMAL MAMMOGRAM OF BOTH BREASTS: ICD-10-CM

## 2024-03-12 PROCEDURE — 77066 DX MAMMO INCL CAD BI: CPT | Mod: 26,,, | Performed by: RADIOLOGY

## 2024-03-12 PROCEDURE — 76642 ULTRASOUND BREAST LIMITED: CPT | Mod: 26,,, | Performed by: RADIOLOGY

## 2024-03-12 PROCEDURE — 76641 ULTRASOUND BREAST COMPLETE: CPT | Mod: TC,50

## 2024-03-12 PROCEDURE — 77062 BREAST TOMOSYNTHESIS BI: CPT | Mod: 26,,, | Performed by: RADIOLOGY

## 2024-03-12 PROCEDURE — 77066 DX MAMMO INCL CAD BI: CPT | Mod: TC

## 2024-09-09 ENCOUNTER — HOSPITAL ENCOUNTER (OUTPATIENT)
Dept: RADIOLOGY | Facility: HOSPITAL | Age: 39
Discharge: HOME | End: 2024-09-09
Attending: NURSE PRACTITIONER
Payer: COMMERCIAL

## 2024-09-09 DIAGNOSIS — R92.8 ABNORMAL MAMMOGRAM OF BOTH BREASTS: ICD-10-CM

## 2024-09-09 DIAGNOSIS — R92.8 ABNORMAL MAMMOGRAM OF BOTH BREASTS: Primary | ICD-10-CM

## 2024-09-09 PROCEDURE — 77062 BREAST TOMOSYNTHESIS BI: CPT | Mod: TC

## 2024-09-09 PROCEDURE — 76642 ULTRASOUND BREAST LIMITED: CPT | Mod: TC,50

## 2024-09-09 PROCEDURE — 77066 DX MAMMO INCL CAD BI: CPT | Mod: TC

## 2024-10-14 ENCOUNTER — HOSPITAL ENCOUNTER (OUTPATIENT)
Dept: CARDIOLOGY | Facility: HOSPITAL | Age: 39
Discharge: HOME | End: 2024-10-14
Attending: NURSE PRACTITIONER
Payer: COMMERCIAL

## 2024-10-14 DIAGNOSIS — I10 ESSENTIAL HYPERTENSION: ICD-10-CM

## 2024-10-14 LAB
OHS QRS DURATION: 86 MS
OHS QTC CALCULATION: 426 MS

## 2024-10-14 PROCEDURE — 93005 ELECTROCARDIOGRAM TRACING: CPT

## 2024-10-14 PROCEDURE — 93010 ELECTROCARDIOGRAM REPORT: CPT | Mod: ,,, | Performed by: INTERNAL MEDICINE

## 2024-10-24 ENCOUNTER — HOSPITAL ENCOUNTER (EMERGENCY)
Facility: HOSPITAL | Age: 39
Discharge: HOME | End: 2024-10-24
Attending: EMERGENCY MEDICINE
Payer: COMMERCIAL

## 2024-10-24 VITALS
WEIGHT: 187 LBS | DIASTOLIC BLOOD PRESSURE: 95 MMHG | TEMPERATURE: 99 F | BODY MASS INDEX: 31.16 KG/M2 | HEART RATE: 76 BPM | SYSTOLIC BLOOD PRESSURE: 157 MMHG | OXYGEN SATURATION: 98 % | RESPIRATION RATE: 16 BRPM | HEIGHT: 65 IN

## 2024-10-24 DIAGNOSIS — M25.511 RIGHT SHOULDER PAIN: ICD-10-CM

## 2024-10-24 DIAGNOSIS — M54.12 CERVICAL RADICULOPATHY: Primary | ICD-10-CM

## 2024-10-24 PROCEDURE — 96372 THER/PROPH/DIAG INJ SC/IM: CPT | Performed by: NURSE PRACTITIONER

## 2024-10-24 PROCEDURE — 99284 EMERGENCY DEPT VISIT MOD MDM: CPT | Mod: 25

## 2024-10-24 PROCEDURE — 73030 X-RAY EXAM OF SHOULDER: CPT | Mod: 26,RT,, | Performed by: RADIOLOGY

## 2024-10-24 PROCEDURE — 73030 X-RAY EXAM OF SHOULDER: CPT | Mod: TC,RT

## 2024-10-24 PROCEDURE — 63600175 PHARM REV CODE 636 W HCPCS: Performed by: NURSE PRACTITIONER

## 2024-10-24 RX ORDER — CYCLOBENZAPRINE HCL 10 MG
10 TABLET ORAL 3 TIMES DAILY PRN
Qty: 15 TABLET | Refills: 0 | Status: SHIPPED | OUTPATIENT
Start: 2024-10-24 | End: 2024-10-29

## 2024-10-24 RX ORDER — KETOROLAC TROMETHAMINE 30 MG/ML
60 INJECTION, SOLUTION INTRAMUSCULAR; INTRAVENOUS
Status: COMPLETED | OUTPATIENT
Start: 2024-10-24 | End: 2024-10-24

## 2024-10-24 RX ORDER — NAPROXEN 500 MG/1
500 TABLET ORAL 2 TIMES DAILY WITH MEALS
Qty: 20 TABLET | Refills: 0 | Status: SHIPPED | OUTPATIENT
Start: 2024-10-24 | End: 2024-11-03

## 2024-10-24 RX ADMIN — KETOROLAC TROMETHAMINE 60 MG: 30 INJECTION, SOLUTION INTRAMUSCULAR; INTRAVENOUS at 06:10

## 2024-10-30 ENCOUNTER — OCCUPATIONAL HEALTH (OUTPATIENT)
Dept: URGENT CARE | Facility: CLINIC | Age: 39
End: 2024-10-30

## 2025-03-05 ENCOUNTER — HOSPITAL ENCOUNTER (OUTPATIENT)
Dept: RADIOLOGY | Facility: HOSPITAL | Age: 40
Discharge: HOME | End: 2025-03-05
Attending: NURSE PRACTITIONER
Payer: COMMERCIAL

## 2025-03-05 DIAGNOSIS — R92.8 ABNORMAL MAMMOGRAM OF BOTH BREASTS: ICD-10-CM

## 2025-03-05 PROCEDURE — 77062 BREAST TOMOSYNTHESIS BI: CPT | Mod: 26,,, | Performed by: RADIOLOGY

## 2025-03-05 PROCEDURE — 76642 ULTRASOUND BREAST LIMITED: CPT | Mod: 26,,, | Performed by: RADIOLOGY

## 2025-03-05 PROCEDURE — 76642 ULTRASOUND BREAST LIMITED: CPT | Mod: TC,50

## 2025-03-05 PROCEDURE — 77066 DX MAMMO INCL CAD BI: CPT | Mod: TC

## 2025-03-05 PROCEDURE — 77066 DX MAMMO INCL CAD BI: CPT | Mod: 26,,, | Performed by: RADIOLOGY

## 2025-04-27 ENCOUNTER — LAB VISIT (OUTPATIENT)
Dept: LAB | Facility: HOSPITAL | Age: 40
End: 2025-04-27
Attending: NURSE PRACTITIONER
Payer: COMMERCIAL

## 2025-04-27 DIAGNOSIS — Z31.9 PATIENT DESIRES PREGNANCY: ICD-10-CM

## 2025-04-27 LAB — PROGEST SERPL-MCNC: 12.3 NG/ML

## 2025-04-27 PROCEDURE — 84144 ASSAY OF PROGESTERONE: CPT

## 2025-04-27 PROCEDURE — 36415 COLL VENOUS BLD VENIPUNCTURE: CPT

## 2025-07-31 ENCOUNTER — LAB VISIT (OUTPATIENT)
Dept: LAB | Facility: HOSPITAL | Age: 40
End: 2025-07-31
Attending: EMERGENCY MEDICINE
Payer: COMMERCIAL

## 2025-07-31 DIAGNOSIS — Z31.9 PATIENT DESIRES PREGNANCY: ICD-10-CM

## 2025-07-31 PROCEDURE — 36415 COLL VENOUS BLD VENIPUNCTURE: CPT

## 2025-07-31 PROCEDURE — 84144 ASSAY OF PROGESTERONE: CPT

## 2025-08-01 LAB — PROGEST SERPL IA-MCNC: <0.2 NG/ML

## (undated) DEVICE — SEE MEDLINE ITEM 157148

## (undated) DEVICE — CANISTER SUCTION 3000CC

## (undated) DEVICE — DRAPE INCISE IOBAN 2 13X13IN

## (undated) DEVICE — GOWN B1 X-LG X-LONG

## (undated) DEVICE — SEE MEDLINE ITEM 146292

## (undated) DEVICE — SUT ETHIBOND 0 CR/CT-1 8-18

## (undated) DEVICE — SEE MEDLINE ITEM 152622

## (undated) DEVICE — GOWN X-LARGE

## (undated) DEVICE — GLOVE SURG ULTRA TOUCH 7.5

## (undated) DEVICE — BLADE EZ CLEAN 2.5IN MODIFIED

## (undated) DEVICE — SLEEVE SCD EXPRESS CALF MEDIUM

## (undated) DEVICE — SEE MEDLINE ITEM 146417

## (undated) DEVICE — SUT ETHIBOND 0 CR/MO-7 8-18

## (undated) DEVICE — SOL 9P NACL IRR PIC IL

## (undated) DEVICE — SLEEVE SCD EXPRESS CALF LARGE

## (undated) DEVICE — GLOVE SURG ULTRA TOUCH 7

## (undated) DEVICE — SUT MONOCRYL 4-0 PS-2

## (undated) DEVICE — CLIPPER BLADE MOD 4406 (CAREF)

## (undated) DEVICE — GOWN SURGICAL XX LARGE X LONG

## (undated) DEVICE — GLOVE PI ULTRA TOUCH G SURGEON

## (undated) DEVICE — SUT CTD VICRYL 3-0 CR/SH

## (undated) DEVICE — NDL SAFETY 25G X 1.5 ECLIPSE

## (undated) DEVICE — ADHESIVE DERMABOND ADVANCED

## (undated) DEVICE — SUT CTD VICRYL 4-0 BR PS-2

## (undated) DEVICE — APPLICATOR CHLORAPREP ORN 26ML

## (undated) DEVICE — GLOVE SURG ULTRA TOUCH 6

## (undated) DEVICE — SYR B-D DISP CONTROL 10CC100/C

## (undated) DEVICE — PACK CUSTOM UNIV BASIN SLI

## (undated) DEVICE — SPONGE LAP 18X18 PREWASHED

## (undated) DEVICE — Device

## (undated) DEVICE — GOWN XX-LARGE

## (undated) DEVICE — ELECTRODE REM PLYHSV RETURN 9

## (undated) DEVICE — GLOVE SURGEONS ULTRA TOUCH 6.5